# Patient Record
Sex: MALE | Race: WHITE
[De-identification: names, ages, dates, MRNs, and addresses within clinical notes are randomized per-mention and may not be internally consistent; named-entity substitution may affect disease eponyms.]

---

## 2017-05-03 ENCOUNTER — HOSPITAL ENCOUNTER (OUTPATIENT)
Dept: HOSPITAL 83 - LAB | Age: 57
Discharge: HOME | End: 2017-05-03
Attending: CHIROPRACTOR
Payer: COMMERCIAL

## 2017-05-03 DIAGNOSIS — I10: ICD-10-CM

## 2017-05-03 DIAGNOSIS — Z98.890: ICD-10-CM

## 2017-05-03 DIAGNOSIS — M25.512: Primary | ICD-10-CM

## 2017-05-03 LAB
INR BLD: 1 (ref 2–3.5)
PROTHROMBIN TIME: 10.7 SECONDS (ref 9–12.4)

## 2017-07-25 ENCOUNTER — HOSPITAL ENCOUNTER (OUTPATIENT)
Dept: HOSPITAL 83 - RESCLI | Age: 57
Discharge: HOME | End: 2017-07-25
Attending: INTERNAL MEDICINE
Payer: COMMERCIAL

## 2017-07-25 DIAGNOSIS — F41.9: ICD-10-CM

## 2017-07-25 DIAGNOSIS — E03.9: ICD-10-CM

## 2017-07-25 DIAGNOSIS — K21.9: ICD-10-CM

## 2017-07-25 DIAGNOSIS — I10: Primary | ICD-10-CM

## 2017-07-25 DIAGNOSIS — E55.9: ICD-10-CM

## 2017-11-28 ENCOUNTER — HOSPITAL ENCOUNTER (OUTPATIENT)
Dept: HOSPITAL 83 - RESCLI | Age: 57
Discharge: HOME | End: 2017-11-28
Attending: EMERGENCY MEDICINE
Payer: COMMERCIAL

## 2017-11-28 DIAGNOSIS — F32.9: ICD-10-CM

## 2017-11-28 DIAGNOSIS — E03.9: ICD-10-CM

## 2017-11-28 DIAGNOSIS — I10: ICD-10-CM

## 2017-11-28 DIAGNOSIS — K21.9: ICD-10-CM

## 2017-11-28 DIAGNOSIS — F41.1: ICD-10-CM

## 2017-11-28 DIAGNOSIS — E55.9: ICD-10-CM

## 2017-11-28 DIAGNOSIS — F10.20: Primary | ICD-10-CM

## 2018-04-11 ENCOUNTER — HOSPITAL ENCOUNTER (INPATIENT)
Dept: HOSPITAL 83 - ED | Age: 58
LOS: 8 days | Discharge: HOME | DRG: 189 | End: 2018-04-19
Attending: INTERNAL MEDICINE | Admitting: INTERNAL MEDICINE
Payer: COMMERCIAL

## 2018-04-11 VITALS — DIASTOLIC BLOOD PRESSURE: 85 MMHG

## 2018-04-11 VITALS — BODY MASS INDEX: 28.52 KG/M2 | HEIGHT: 66 IN | WEIGHT: 177.44 LBS

## 2018-04-11 VITALS — DIASTOLIC BLOOD PRESSURE: 90 MMHG

## 2018-04-11 VITALS — DIASTOLIC BLOOD PRESSURE: 77 MMHG

## 2018-04-11 DIAGNOSIS — R73.9: ICD-10-CM

## 2018-04-11 DIAGNOSIS — F41.1: ICD-10-CM

## 2018-04-11 DIAGNOSIS — F10.239: ICD-10-CM

## 2018-04-11 DIAGNOSIS — E66.3: ICD-10-CM

## 2018-04-11 DIAGNOSIS — D72.819: ICD-10-CM

## 2018-04-11 DIAGNOSIS — D53.9: ICD-10-CM

## 2018-04-11 DIAGNOSIS — E87.6: ICD-10-CM

## 2018-04-11 DIAGNOSIS — K21.9: ICD-10-CM

## 2018-04-11 DIAGNOSIS — R00.0: ICD-10-CM

## 2018-04-11 DIAGNOSIS — D61.818: ICD-10-CM

## 2018-04-11 DIAGNOSIS — F10.29: ICD-10-CM

## 2018-04-11 DIAGNOSIS — Z79.899: ICD-10-CM

## 2018-04-11 DIAGNOSIS — Z71.6: ICD-10-CM

## 2018-04-11 DIAGNOSIS — D69.6: ICD-10-CM

## 2018-04-11 DIAGNOSIS — I10: ICD-10-CM

## 2018-04-11 DIAGNOSIS — R65.10: ICD-10-CM

## 2018-04-11 DIAGNOSIS — Z79.82: ICD-10-CM

## 2018-04-11 DIAGNOSIS — J96.01: Primary | ICD-10-CM

## 2018-04-11 DIAGNOSIS — E44.0: ICD-10-CM

## 2018-04-11 DIAGNOSIS — E87.1: ICD-10-CM

## 2018-04-11 DIAGNOSIS — E03.9: ICD-10-CM

## 2018-04-11 DIAGNOSIS — F17.200: ICD-10-CM

## 2018-04-11 LAB
ALBUMIN SERPL-MCNC: 3.1 GM/DL (ref 3.1–4.5)
ALP SERPL-CCNC: 100 U/L (ref 45–117)
ALT SERPL W P-5'-P-CCNC: 57 U/L (ref 12–78)
AMPHETAMINES UR QL SCN: < 1000
APPEARANCE UR: CLEAR
APTT PPP: 25.2 SECONDS (ref 20.8–31.5)
AST SERPL-CCNC: 153 IU/L (ref 3–35)
BACTERIA #/AREA URNS HPF: (no result) /[HPF]
BARBITURATES UR QL SCN: < 200
BASOPHILS # BLD AUTO: 0.1 10*3/UL (ref 0–0.1)
BASOPHILS NFR BLD AUTO: 1.7 % (ref 0–1)
BENZODIAZ UR QL SCN: < 200
BILIRUB UR QL STRIP: NEGATIVE
BUN SERPL-MCNC: 3 MG/DL (ref 7–24)
BZE UR QL SCN: < 300
CANNABINOIDS UR QL SCN: < 50
CHLORIDE SERPL-SCNC: 100 MMOL/L (ref 98–107)
COLOR UR: YELLOW
CREAT SERPL-MCNC: 0.5 MG/DL (ref 0.7–1.3)
EOSINOPHIL # BLD AUTO: 0 10*3/UL (ref 0–0.4)
EOSINOPHIL # BLD AUTO: 0.3 % (ref 1–4)
EPI CELLS #/AREA URNS HPF: (no result) /[HPF]
ERYTHROCYTE [DISTWIDTH] IN BLOOD BY AUTOMATED COUNT: 12.9 % (ref 0–14.5)
ETHANOL SERPL-MCNC: 376 MG/DL (ref ?–3)
GLUCOSE UR QL: NEGATIVE
HCT VFR BLD AUTO: 39 % (ref 42–52)
HGB BLD-MCNC: 13.6 G/DL (ref 14–18)
HGB UR QL STRIP: NEGATIVE
INR BLD: 1.1 (ref 2–3.5)
KETONES UR QL STRIP: NEGATIVE
LEUKOCYTE ESTERASE UR QL STRIP: NEGATIVE
LYMPHOCYTES # BLD AUTO: 1 10*3/UL (ref 1.3–4.4)
LYMPHOCYTES NFR BLD AUTO: 26.6 % (ref 27–41)
MCH RBC QN AUTO: 35.1 PG (ref 27–31)
MCHC RBC AUTO-ENTMCNC: 34.9 G/DL (ref 33–37)
MCV RBC AUTO: 100.5 FL (ref 80–94)
METHADONE UR QL SCN: < 300
MONOCYTES # BLD AUTO: 0.7 10*3/UL (ref 0.1–1)
MONOCYTES NFR BLD MANUAL: 19.9 % (ref 3–9)
NEUT #: 1.8 10*3/UL (ref 2.3–7.9)
NEUT %: 51.5 % (ref 47–73)
NITRITE UR QL STRIP: NEGATIVE
NRBC BLD QL AUTO: 0 % (ref 0–0)
OPIATES UR QL SCN: < 300
PCP UR QL SCN: <  25
PH UR STRIP: 5.5 [PH] (ref 5–9)
PLATELET # BLD AUTO: 127 10*3/UL (ref 130–400)
PMV BLD AUTO: 9 FL (ref 9.6–12.3)
POTASSIUM SERPL-SCNC: 3.3 MMOL/L (ref 3.5–5.1)
PROT SERPL-MCNC: 7.9 GM/DL (ref 6.4–8.2)
RBC # BLD AUTO: 3.88 10*6/UL (ref 4.5–5.9)
RBC #/AREA URNS HPF: (no result) RBC/HPF (ref 0–2)
SODIUM SERPL-SCNC: 133 MMOL/L (ref 136–145)
SP GR UR: <= 1.005 (ref 1–1.03)
UROBILINOGEN UR STRIP-MCNC: 0.2 E.U./DL (ref 0.2–1)
WBC #/AREA URNS HPF: (no result) WBC/HPF (ref 0–5)
WBC NRBC COR # BLD AUTO: 3.6 10*3/UL (ref 4.8–10.8)

## 2018-04-12 VITALS — DIASTOLIC BLOOD PRESSURE: 79 MMHG

## 2018-04-12 VITALS — DIASTOLIC BLOOD PRESSURE: 105 MMHG

## 2018-04-12 VITALS — DIASTOLIC BLOOD PRESSURE: 54 MMHG | SYSTOLIC BLOOD PRESSURE: 124 MMHG

## 2018-04-12 VITALS — SYSTOLIC BLOOD PRESSURE: 141 MMHG | DIASTOLIC BLOOD PRESSURE: 70 MMHG

## 2018-04-12 VITALS — DIASTOLIC BLOOD PRESSURE: 98 MMHG

## 2018-04-12 LAB
ALBUMIN SERPL-MCNC: 2.9 GM/DL (ref 3.1–4.5)
ALP SERPL-CCNC: 89 U/L (ref 45–117)
ALT SERPL W P-5'-P-CCNC: 53 U/L (ref 12–78)
AST SERPL-CCNC: 131 IU/L (ref 3–35)
BASOPHILS # BLD AUTO: 0.1 10*3/UL (ref 0–0.1)
BASOPHILS NFR BLD AUTO: 1.9 % (ref 0–1)
BUN SERPL-MCNC: 3 MG/DL (ref 7–24)
CHLORIDE SERPL-SCNC: 103 MMOL/L (ref 98–107)
CREAT SERPL-MCNC: 0.46 MG/DL (ref 0.7–1.3)
EOSINOPHIL # BLD AUTO: 0 10*3/UL (ref 0–0.4)
EOSINOPHIL # BLD AUTO: 0.9 % (ref 1–4)
ERYTHROCYTE [DISTWIDTH] IN BLOOD BY AUTOMATED COUNT: 13 % (ref 0–14.5)
HCT VFR BLD AUTO: 37 % (ref 42–52)
HGB BLD-MCNC: 12.9 G/DL (ref 14–18)
LYMPHOCYTES # BLD AUTO: 1 10*3/UL (ref 1.3–4.4)
LYMPHOCYTES NFR BLD AUTO: 31.8 % (ref 27–41)
MCH RBC QN AUTO: 35.6 PG (ref 27–31)
MCHC RBC AUTO-ENTMCNC: 34.9 G/DL (ref 33–37)
MCV RBC AUTO: 102.2 FL (ref 80–94)
MONOCYTES # BLD AUTO: 0.6 10*3/UL (ref 0.1–1)
MONOCYTES NFR BLD MANUAL: 17.1 % (ref 3–9)
NEUT #: 1.5 10*3/UL (ref 2.3–7.9)
NEUT %: 48 % (ref 47–73)
NRBC BLD QL AUTO: 0 10*3/UL (ref 0–0)
PLATELET # BLD AUTO: 124 10*3/UL (ref 130–400)
PMV BLD AUTO: 9.5 FL (ref 9.6–12.3)
POTASSIUM SERPL-SCNC: 3.5 MMOL/L (ref 3.5–5.1)
PROT SERPL-MCNC: 7.3 GM/DL (ref 6.4–8.2)
RBC # BLD AUTO: 3.62 10*6/UL (ref 4.5–5.9)
SODIUM SERPL-SCNC: 136 MMOL/L (ref 136–145)
WBC NRBC COR # BLD AUTO: 3.2 10*3/UL (ref 4.8–10.8)

## 2018-04-13 VITALS — SYSTOLIC BLOOD PRESSURE: 130 MMHG | DIASTOLIC BLOOD PRESSURE: 77 MMHG

## 2018-04-13 VITALS — DIASTOLIC BLOOD PRESSURE: 96 MMHG | SYSTOLIC BLOOD PRESSURE: 152 MMHG

## 2018-04-13 VITALS — DIASTOLIC BLOOD PRESSURE: 93 MMHG | SYSTOLIC BLOOD PRESSURE: 163 MMHG

## 2018-04-13 VITALS — DIASTOLIC BLOOD PRESSURE: 86 MMHG

## 2018-04-13 VITALS — DIASTOLIC BLOOD PRESSURE: 88 MMHG | SYSTOLIC BLOOD PRESSURE: 134 MMHG

## 2018-04-13 VITALS — DIASTOLIC BLOOD PRESSURE: 98 MMHG

## 2018-04-14 VITALS — DIASTOLIC BLOOD PRESSURE: 113 MMHG

## 2018-04-14 VITALS — DIASTOLIC BLOOD PRESSURE: 106 MMHG

## 2018-04-14 VITALS — SYSTOLIC BLOOD PRESSURE: 146 MMHG | DIASTOLIC BLOOD PRESSURE: 97 MMHG

## 2018-04-14 VITALS — DIASTOLIC BLOOD PRESSURE: 93 MMHG | SYSTOLIC BLOOD PRESSURE: 128 MMHG

## 2018-04-14 VITALS — DIASTOLIC BLOOD PRESSURE: 89 MMHG | SYSTOLIC BLOOD PRESSURE: 196 MMHG

## 2018-04-14 VITALS — DIASTOLIC BLOOD PRESSURE: 79 MMHG

## 2018-04-14 LAB
ALBUMIN SERPL-MCNC: 3.2 GM/DL (ref 3.1–4.5)
ALP SERPL-CCNC: 91 U/L (ref 45–117)
ALT SERPL W P-5'-P-CCNC: 46 U/L (ref 12–78)
AST SERPL-CCNC: 85 IU/L (ref 3–35)
BASOPHILS # BLD AUTO: 0 10*3/UL (ref 0–0.1)
BASOPHILS NFR BLD AUTO: 0.5 % (ref 0–1)
BUN SERPL-MCNC: 4 MG/DL (ref 7–24)
CHLORIDE SERPL-SCNC: 105 MMOL/L (ref 98–107)
CREAT SERPL-MCNC: 0.53 MG/DL (ref 0.7–1.3)
EOSINOPHIL # BLD AUTO: 0 10*3/UL (ref 0–0.4)
EOSINOPHIL # BLD AUTO: 0.4 % (ref 1–4)
ERYTHROCYTE [DISTWIDTH] IN BLOOD BY AUTOMATED COUNT: 12.6 % (ref 0–14.5)
HCT VFR BLD AUTO: 38.2 % (ref 42–52)
HGB BLD-MCNC: 13.3 G/DL (ref 14–18)
LYMPHOCYTES # BLD AUTO: 0.8 10*3/UL (ref 1.3–4.4)
LYMPHOCYTES NFR BLD AUTO: 15.1 % (ref 27–41)
MCH RBC QN AUTO: 35.2 PG (ref 27–31)
MCHC RBC AUTO-ENTMCNC: 34.8 G/DL (ref 33–37)
MCV RBC AUTO: 101.1 FL (ref 80–94)
MONOCYTES # BLD AUTO: 0.7 10*3/UL (ref 0.1–1)
MONOCYTES NFR BLD MANUAL: 12.2 % (ref 3–9)
NEUT #: 3.9 10*3/UL (ref 2.3–7.9)
NEUT %: 71.4 % (ref 47–73)
NRBC BLD QL AUTO: 0 10*3/UL (ref 0–0)
PHOSPHATE SERPL-MCNC: 2.4 MG/DL (ref 2.5–4.9)
PLATELET # BLD AUTO: 130 10*3/UL (ref 130–400)
PMV BLD AUTO: 10.2 FL (ref 9.6–12.3)
POTASSIUM SERPL-SCNC: 3.2 MMOL/L (ref 3.5–5.1)
PROT SERPL-MCNC: 7.9 GM/DL (ref 6.4–8.2)
RBC # BLD AUTO: 3.78 10*6/UL (ref 4.5–5.9)
SODIUM SERPL-SCNC: 139 MMOL/L (ref 136–145)
WBC NRBC COR # BLD AUTO: 5.5 10*3/UL (ref 4.8–10.8)

## 2018-04-15 VITALS — DIASTOLIC BLOOD PRESSURE: 77 MMHG | SYSTOLIC BLOOD PRESSURE: 129 MMHG

## 2018-04-15 VITALS — DIASTOLIC BLOOD PRESSURE: 84 MMHG | SYSTOLIC BLOOD PRESSURE: 127 MMHG

## 2018-04-15 VITALS — DIASTOLIC BLOOD PRESSURE: 87 MMHG | SYSTOLIC BLOOD PRESSURE: 139 MMHG

## 2018-04-15 VITALS — DIASTOLIC BLOOD PRESSURE: 74 MMHG

## 2018-04-15 VITALS — DIASTOLIC BLOOD PRESSURE: 93 MMHG | SYSTOLIC BLOOD PRESSURE: 128 MMHG

## 2018-04-15 VITALS — DIASTOLIC BLOOD PRESSURE: 82 MMHG

## 2018-04-15 LAB
ALBUMIN SERPL-MCNC: 2.8 GM/DL (ref 3.1–4.5)
ALP SERPL-CCNC: 69 U/L (ref 45–117)
ALT SERPL W P-5'-P-CCNC: 47 U/L (ref 12–78)
AST SERPL-CCNC: 88 IU/L (ref 3–35)
BUN SERPL-MCNC: 7 MG/DL (ref 7–24)
CHLORIDE SERPL-SCNC: 101 MMOL/L (ref 98–107)
CREAT SERPL-MCNC: 0.49 MG/DL (ref 0.7–1.3)
POTASSIUM SERPL-SCNC: 3 MMOL/L (ref 3.5–5.1)
PROT SERPL-MCNC: 7 GM/DL (ref 6.4–8.2)
SODIUM SERPL-SCNC: 137 MMOL/L (ref 136–145)

## 2018-04-16 VITALS — DIASTOLIC BLOOD PRESSURE: 81 MMHG

## 2018-04-16 VITALS — DIASTOLIC BLOOD PRESSURE: 87 MMHG

## 2018-04-16 VITALS — SYSTOLIC BLOOD PRESSURE: 142 MMHG | DIASTOLIC BLOOD PRESSURE: 82 MMHG

## 2018-04-16 VITALS — DIASTOLIC BLOOD PRESSURE: 76 MMHG | SYSTOLIC BLOOD PRESSURE: 144 MMHG

## 2018-04-16 VITALS — DIASTOLIC BLOOD PRESSURE: 73 MMHG

## 2018-04-17 VITALS — DIASTOLIC BLOOD PRESSURE: 86 MMHG

## 2018-04-17 VITALS — DIASTOLIC BLOOD PRESSURE: 80 MMHG

## 2018-04-17 VITALS — DIASTOLIC BLOOD PRESSURE: 74 MMHG | SYSTOLIC BLOOD PRESSURE: 130 MMHG

## 2018-04-17 VITALS — SYSTOLIC BLOOD PRESSURE: 134 MMHG | DIASTOLIC BLOOD PRESSURE: 84 MMHG

## 2018-04-17 VITALS — SYSTOLIC BLOOD PRESSURE: 138 MMHG | DIASTOLIC BLOOD PRESSURE: 75 MMHG

## 2018-04-17 LAB
BUN SERPL-MCNC: 6 MG/DL (ref 7–24)
CHLORIDE SERPL-SCNC: 102 MMOL/L (ref 98–107)
CREAT SERPL-MCNC: 0.5 MG/DL (ref 0.7–1.3)
PHOSPHATE SERPL-MCNC: 3 MG/DL (ref 2.5–4.9)
POTASSIUM SERPL-SCNC: 3.1 MMOL/L (ref 3.5–5.1)
SODIUM SERPL-SCNC: 136 MMOL/L (ref 136–145)

## 2018-04-18 VITALS — SYSTOLIC BLOOD PRESSURE: 143 MMHG | DIASTOLIC BLOOD PRESSURE: 76 MMHG

## 2018-04-18 VITALS — SYSTOLIC BLOOD PRESSURE: 118 MMHG | DIASTOLIC BLOOD PRESSURE: 78 MMHG

## 2018-04-18 VITALS — DIASTOLIC BLOOD PRESSURE: 80 MMHG

## 2018-04-18 VITALS — DIASTOLIC BLOOD PRESSURE: 81 MMHG | SYSTOLIC BLOOD PRESSURE: 139 MMHG

## 2018-04-18 VITALS — DIASTOLIC BLOOD PRESSURE: 78 MMHG

## 2018-04-18 LAB
BASOPHILS # BLD AUTO: 0.1 10*3/UL (ref 0–0.1)
BASOPHILS NFR BLD AUTO: 1 % (ref 0–1)
BUN SERPL-MCNC: 6 MG/DL (ref 7–24)
CHLORIDE SERPL-SCNC: 101 MMOL/L (ref 98–107)
CREAT SERPL-MCNC: 0.45 MG/DL (ref 0.7–1.3)
EOSINOPHIL # BLD AUTO: 0.2 10*3/UL (ref 0–0.4)
EOSINOPHIL # BLD AUTO: 3.7 % (ref 1–4)
ERYTHROCYTE [DISTWIDTH] IN BLOOD BY AUTOMATED COUNT: 12 % (ref 0–14.5)
HCT VFR BLD AUTO: 39.2 % (ref 42–52)
HGB BLD-MCNC: 13.4 G/DL (ref 14–18)
LYMPHOCYTES # BLD AUTO: 1.3 10*3/UL (ref 1.3–4.4)
LYMPHOCYTES NFR BLD AUTO: 27.2 % (ref 27–41)
MCH RBC QN AUTO: 35.1 PG (ref 27–31)
MCHC RBC AUTO-ENTMCNC: 34.2 G/DL (ref 33–37)
MCV RBC AUTO: 102.6 FL (ref 80–94)
MONOCYTES # BLD AUTO: 0.9 10*3/UL (ref 0.1–1)
MONOCYTES NFR BLD MANUAL: 18.5 % (ref 3–9)
NEUT #: 2.4 10*3/UL (ref 2.3–7.9)
NEUT %: 48.8 % (ref 47–73)
NRBC BLD QL AUTO: 0 10*3/UL (ref 0–0)
PLATELET # BLD AUTO: 173 10*3/UL (ref 130–400)
PMV BLD AUTO: 10.3 FL (ref 9.6–12.3)
POTASSIUM SERPL-SCNC: 3.2 MMOL/L (ref 3.5–5.1)
RBC # BLD AUTO: 3.82 10*6/UL (ref 4.5–5.9)
SODIUM SERPL-SCNC: 136 MMOL/L (ref 136–145)
WBC NRBC COR # BLD AUTO: 4.9 10*3/UL (ref 4.8–10.8)

## 2018-04-19 VITALS — DIASTOLIC BLOOD PRESSURE: 95 MMHG

## 2018-04-19 VITALS — DIASTOLIC BLOOD PRESSURE: 68 MMHG

## 2018-04-19 LAB
BUN SERPL-MCNC: 5 MG/DL (ref 7–24)
CHLORIDE SERPL-SCNC: 100 MMOL/L (ref 98–107)
CREAT SERPL-MCNC: 0.52 MG/DL (ref 0.7–1.3)
POTASSIUM SERPL-SCNC: 3.6 MMOL/L (ref 3.5–5.1)
SODIUM SERPL-SCNC: 134 MMOL/L (ref 136–145)

## 2018-05-02 ENCOUNTER — HOSPITAL ENCOUNTER (INPATIENT)
Dept: HOSPITAL 83 - ED | Age: 58
LOS: 11 days | Discharge: HOME | DRG: 871 | End: 2018-05-13
Attending: INTERNAL MEDICINE | Admitting: INTERNAL MEDICINE
Payer: COMMERCIAL

## 2018-05-02 VITALS — SYSTOLIC BLOOD PRESSURE: 146 MMHG | DIASTOLIC BLOOD PRESSURE: 95 MMHG

## 2018-05-02 VITALS — DIASTOLIC BLOOD PRESSURE: 96 MMHG

## 2018-05-02 VITALS — SYSTOLIC BLOOD PRESSURE: 157 MMHG | DIASTOLIC BLOOD PRESSURE: 107 MMHG

## 2018-05-02 VITALS — SYSTOLIC BLOOD PRESSURE: 147 MMHG | DIASTOLIC BLOOD PRESSURE: 98 MMHG

## 2018-05-02 VITALS — DIASTOLIC BLOOD PRESSURE: 88 MMHG

## 2018-05-02 VITALS — DIASTOLIC BLOOD PRESSURE: 80 MMHG

## 2018-05-02 VITALS — WEIGHT: 168.25 LBS | BODY MASS INDEX: 27.04 KG/M2 | HEIGHT: 66 IN

## 2018-05-02 VITALS — DIASTOLIC BLOOD PRESSURE: 89 MMHG
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
## 2018-05-02 DIAGNOSIS — E87.6: ICD-10-CM

## 2018-05-02 DIAGNOSIS — E66.9: ICD-10-CM

## 2018-05-02 DIAGNOSIS — R74.0: ICD-10-CM
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
## 2018-05-02 DIAGNOSIS — F17.200: ICD-10-CM

## 2018-05-02 DIAGNOSIS — E03.9: ICD-10-CM

## 2018-05-02 DIAGNOSIS — E46: ICD-10-CM

## 2018-05-02 DIAGNOSIS — E83.41: ICD-10-CM

## 2018-05-02 DIAGNOSIS — A41.9: Primary | ICD-10-CM

## 2018-05-02 DIAGNOSIS — E78.2: ICD-10-CM

## 2018-05-02 DIAGNOSIS — J96.00: ICD-10-CM

## 2018-05-02 DIAGNOSIS — T17.590A: ICD-10-CM

## 2018-05-02 DIAGNOSIS — R73.9: ICD-10-CM

## 2018-05-02 DIAGNOSIS — J21.9: ICD-10-CM
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
## 2018-05-02 DIAGNOSIS — E87.1: ICD-10-CM

## 2018-05-02 DIAGNOSIS — I10: ICD-10-CM

## 2018-05-02 DIAGNOSIS — K21.9: ICD-10-CM

## 2018-05-02 DIAGNOSIS — E87.5: ICD-10-CM

## 2018-05-02 DIAGNOSIS — D53.9: ICD-10-CM

## 2018-05-02 DIAGNOSIS — E55.9: ICD-10-CM

## 2018-05-02 DIAGNOSIS — F10.129: ICD-10-CM
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
## 2018-05-02 DIAGNOSIS — T17.490A: ICD-10-CM

## 2018-05-02 DIAGNOSIS — Z71.6: ICD-10-CM

## 2018-05-02 DIAGNOSIS — F32.9: ICD-10-CM

## 2018-05-02 DIAGNOSIS — E87.2: ICD-10-CM

## 2018-05-02 DIAGNOSIS — F41.1: ICD-10-CM
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
## 2018-05-02 DIAGNOSIS — F41.9: ICD-10-CM

## 2018-05-02 DIAGNOSIS — S22.42XA: ICD-10-CM

## 2018-05-02 DIAGNOSIS — K12.2: ICD-10-CM

## 2018-05-02 DIAGNOSIS — Z82.49: ICD-10-CM

## 2018-05-02 DIAGNOSIS — Z79.82: ICD-10-CM

## 2018-05-02 DIAGNOSIS — F10.231: ICD-10-CM

## 2018-05-02 LAB
ALBUMIN SERPL-MCNC: 3.4 GM/DL (ref 3.1–4.5)
ALP SERPL-CCNC: 94 U/L (ref 45–117)
ALT SERPL W P-5'-P-CCNC: 76 U/L (ref 12–78)
APTT PPP: 26.3 SECONDS (ref 20.8–31.5)
AST SERPL-CCNC: 113 IU/L (ref 3–35)
BASOPHILS # BLD AUTO: 0.1 10*3/UL (ref 0–0.1)
BASOPHILS NFR BLD AUTO: 1.6 % (ref 0–1)
BUN SERPL-MCNC: 3 MG/DL (ref 7–24)
CHLORIDE SERPL-SCNC: 100 MMOL/L (ref 98–107)
CREAT SERPL-MCNC: 0.57 MG/DL (ref 0.7–1.3)
EOSINOPHIL # BLD AUTO: 0 10*3/UL (ref 0–0.4)
EOSINOPHIL # BLD AUTO: 0.5 % (ref 1–4)
ERYTHROCYTE [DISTWIDTH] IN BLOOD BY AUTOMATED COUNT: 11.7 % (ref 0–14.5)
ETHANOL SERPL-MCNC: 418 MG/DL (ref ?–3)
HCT VFR BLD AUTO: 41.3 % (ref 42–52)
HGB BLD-MCNC: 14.7 G/DL (ref 14–18)
INR BLD: 1.1 (ref 2–3.5)
LYMPHOCYTES # BLD AUTO: 1.7 10*3/UL (ref 1.3–4.4)
LYMPHOCYTES NFR BLD AUTO: 28.7 % (ref 27–41)
MCH RBC QN AUTO: 35 PG (ref 27–31)
MCHC RBC AUTO-ENTMCNC: 35.6 G/DL (ref 33–37)
MCV RBC AUTO: 98.3 FL (ref 80–94)
MONOCYTES # BLD AUTO: 0.6 10*3/UL (ref 0.1–1)
MONOCYTES NFR BLD MANUAL: 11 % (ref 3–9)
NEUT #: 3.3 10*3/UL (ref 2.3–7.9)
NEUT %: 58 % (ref 47–73)
NRBC BLD QL AUTO: 0 % (ref 0–0)
PLATELET # BLD AUTO: 301 10*3/UL (ref 130–400)
PMV BLD AUTO: 8.7 FL (ref 9.6–12.3)
POTASSIUM SERPL-SCNC: 3.5 MMOL/L (ref 3.5–5.1)
PROT SERPL-MCNC: 8.5 GM/DL (ref 6.4–8.2)
RBC # BLD AUTO: 4.2 10*6/UL (ref 4.5–5.9)
SODIUM SERPL-SCNC: 133 MMOL/L (ref 136–145)
TROPONIN I SERPL-MCNC: < 0.015 NG/ML (ref ?–0.04)
WBC NRBC COR # BLD AUTO: 5.7 10*3/UL (ref 4.8–10.8)

## 2018-05-03 VITALS — DIASTOLIC BLOOD PRESSURE: 100 MMHG

## 2018-05-03 VITALS — DIASTOLIC BLOOD PRESSURE: 105 MMHG

## 2018-05-03 VITALS — DIASTOLIC BLOOD PRESSURE: 75 MMHG

## 2018-05-03 VITALS — DIASTOLIC BLOOD PRESSURE: 97 MMHG | SYSTOLIC BLOOD PRESSURE: 150 MMHG

## 2018-05-03 VITALS — DIASTOLIC BLOOD PRESSURE: 48 MMHG | SYSTOLIC BLOOD PRESSURE: 155 MMHG

## 2018-05-03 VITALS — SYSTOLIC BLOOD PRESSURE: 141 MMHG | DIASTOLIC BLOOD PRESSURE: 90 MMHG

## 2018-05-03 LAB
25(OH)D3 SERPL-MCNC: 23.7 NG/ML (ref 30–100)
ALBUMIN SERPL-MCNC: 2.9 GM/DL (ref 3.1–4.5)
ALP SERPL-CCNC: 77 U/L (ref 45–117)
ALT SERPL W P-5'-P-CCNC: 64 U/L (ref 12–78)
AMPHETAMINES UR QL SCN: < 1000
APPEARANCE UR: CLEAR
AST SERPL-CCNC: 99 IU/L (ref 3–35)
BARBITURATES UR QL SCN: < 200
BASOPHILS # BLD AUTO: 0.1 10*3/UL (ref 0–0.1)
BASOPHILS NFR BLD AUTO: 1.6 % (ref 0–1)
BENZODIAZ UR QL SCN: > 200
BILIRUB UR QL STRIP: NEGATIVE
BUN SERPL-MCNC: 3 MG/DL (ref 7–24)
BZE UR QL SCN: < 300
CANNABINOIDS UR QL SCN: < 50
CASTS URNS QL MICRO: (no result)
CHLORIDE SERPL-SCNC: 102 MMOL/L (ref 98–107)
CHOLEST SERPL-MCNC: 117 MG/DL (ref ?–200)
COLOR UR: YELLOW
CREAT SERPL-MCNC: 0.48 MG/DL (ref 0.7–1.3)
EOSINOPHIL # BLD AUTO: 0 10*3/UL (ref 0–0.4)
EOSINOPHIL # BLD AUTO: 0.6 % (ref 1–4)
ERYTHROCYTE [DISTWIDTH] IN BLOOD BY AUTOMATED COUNT: 11.7 % (ref 0–14.5)
GLUCOSE UR QL: NEGATIVE
HCT VFR BLD AUTO: 37.9 % (ref 42–52)
HDLC SERPL-MCNC: 50 MG/DL (ref 40–60)
HGB BLD-MCNC: 13.3 G/DL (ref 14–18)
HGB UR QL STRIP: NEGATIVE
KETONES UR QL STRIP: (no result)
LDLC SERPL DIRECT ASSAY-MCNC: 37 MG/DL (ref 9–159)
LEUKOCYTE ESTERASE UR QL STRIP: NEGATIVE
LYMPHOCYTES # BLD AUTO: 1.9 10*3/UL (ref 1.3–4.4)
LYMPHOCYTES NFR BLD AUTO: 37.5 % (ref 27–41)
MCH RBC QN AUTO: 35.2 PG (ref 27–31)
MCHC RBC AUTO-ENTMCNC: 35.1 G/DL (ref 33–37)
MCV RBC AUTO: 100.3 FL (ref 80–94)
METHADONE UR QL SCN: < 300
MONOCYTES # BLD AUTO: 0.4 10*3/UL (ref 0.1–1)
MONOCYTES NFR BLD MANUAL: 8 % (ref 3–9)
MUCOUS THREADS URNS QL MICRO: (no result)
NEUT #: 2.7 10*3/UL (ref 2.3–7.9)
NEUT %: 51.9 % (ref 47–73)
NITRITE UR QL STRIP: NEGATIVE
NRBC BLD QL AUTO: 0 10*3/UL (ref 0–0)
OPIATES UR QL SCN: > 300
PCP UR QL SCN: <  25
PH UR STRIP: 6 [PH] (ref 5–9)
PHOSPHATE SERPL-MCNC: 3.6 MG/DL (ref 2.5–4.9)
PLATELET # BLD AUTO: 245 10*3/UL (ref 130–400)
PMV BLD AUTO: 9.5 FL (ref 9.6–12.3)
POTASSIUM SERPL-SCNC: 3.2 MMOL/L (ref 3.5–5.1)
PROT SERPL-MCNC: 7.1 GM/DL (ref 6.4–8.2)
RBC # BLD AUTO: 3.78 10*6/UL (ref 4.5–5.9)
RBC #/AREA URNS HPF: (no result) RBC/HPF (ref 0–2)
SODIUM SERPL-SCNC: 135 MMOL/L (ref 136–145)
SP GR UR: 1.02 (ref 1–1.03)
TRIGL SERPL-MCNC: 148 MG/DL (ref ?–150)
TSH SERPL DL<=0.005 MIU/L-ACNC: 0.89 UIU/ML (ref 0.36–4.75)
UROBILINOGEN UR STRIP-MCNC: 1 E.U./DL (ref 0.2–1)
VITAMIN B12: 679 PG/ML (ref 247–911)
VLDLC SERPL CALC-MCNC: 30 MG/DL (ref 6–40)
WBC #/AREA URNS HPF: (no result) WBC/HPF (ref 0–5)
WBC NRBC COR # BLD AUTO: 5.1 10*3/UL (ref 4.8–10.8)

## 2018-05-04 VITALS — DIASTOLIC BLOOD PRESSURE: 87 MMHG

## 2018-05-04 VITALS — DIASTOLIC BLOOD PRESSURE: 103 MMHG

## 2018-05-04 VITALS — DIASTOLIC BLOOD PRESSURE: 84 MMHG

## 2018-05-04 VITALS — DIASTOLIC BLOOD PRESSURE: 93 MMHG

## 2018-05-04 VITALS — DIASTOLIC BLOOD PRESSURE: 101 MMHG

## 2018-05-04 VITALS — SYSTOLIC BLOOD PRESSURE: 148 MMHG | DIASTOLIC BLOOD PRESSURE: 88 MMHG

## 2018-05-04 LAB
BASOPHILS # BLD AUTO: 0.1 10*3/UL (ref 0–0.1)
BASOPHILS NFR BLD AUTO: 1.2 % (ref 0–1)
BUN SERPL-MCNC: 4 MG/DL (ref 7–24)
CHLORIDE SERPL-SCNC: 102 MMOL/L (ref 98–107)
CREAT SERPL-MCNC: 0.53 MG/DL (ref 0.7–1.3)
EOSINOPHIL # BLD AUTO: 0.1 10*3/UL (ref 0–0.4)
EOSINOPHIL # BLD AUTO: 1.9 % (ref 1–4)
ERYTHROCYTE [DISTWIDTH] IN BLOOD BY AUTOMATED COUNT: 11.6 % (ref 0–14.5)
HCT VFR BLD AUTO: 39 % (ref 42–52)
HGB BLD-MCNC: 13.4 G/DL (ref 14–18)
LYMPHOCYTES # BLD AUTO: 1.1 10*3/UL (ref 1.3–4.4)
LYMPHOCYTES NFR BLD AUTO: 26.2 % (ref 27–41)
MCH RBC QN AUTO: 34.9 PG (ref 27–31)
MCHC RBC AUTO-ENTMCNC: 34.4 G/DL (ref 33–37)
MCV RBC AUTO: 101.6 FL (ref 80–94)
MONOCYTES # BLD AUTO: 0.6 10*3/UL (ref 0.1–1)
MONOCYTES NFR BLD MANUAL: 14.3 % (ref 3–9)
NEUT #: 2.3 10*3/UL (ref 2.3–7.9)
NEUT %: 55.9 % (ref 47–73)
NRBC BLD QL AUTO: 0 10*3/UL (ref 0–0)
PLATELET # BLD AUTO: 178 10*3/UL (ref 130–400)
PMV BLD AUTO: 9.5 FL (ref 9.6–12.3)
POTASSIUM SERPL-SCNC: 3.3 MMOL/L (ref 3.5–5.1)
RBC # BLD AUTO: 3.84 10*6/UL (ref 4.5–5.9)
SODIUM SERPL-SCNC: 135 MMOL/L (ref 136–145)
WBC NRBC COR # BLD AUTO: 4.1 10*3/UL (ref 4.8–10.8)

## 2018-05-05 VITALS — SYSTOLIC BLOOD PRESSURE: 98 MMHG | DIASTOLIC BLOOD PRESSURE: 71 MMHG

## 2018-05-05 VITALS — SYSTOLIC BLOOD PRESSURE: 106 MMHG | DIASTOLIC BLOOD PRESSURE: 75 MMHG

## 2018-05-05 VITALS — DIASTOLIC BLOOD PRESSURE: 46 MMHG

## 2018-05-05 VITALS — DIASTOLIC BLOOD PRESSURE: 94 MMHG

## 2018-05-05 VITALS — DIASTOLIC BLOOD PRESSURE: 98 MMHG

## 2018-05-05 VITALS — SYSTOLIC BLOOD PRESSURE: 106 MMHG | DIASTOLIC BLOOD PRESSURE: 54 MMHG

## 2018-05-05 VITALS — DIASTOLIC BLOOD PRESSURE: 76 MMHG

## 2018-05-05 VITALS — SYSTOLIC BLOOD PRESSURE: 140 MMHG | DIASTOLIC BLOOD PRESSURE: 97 MMHG

## 2018-05-05 VITALS — DIASTOLIC BLOOD PRESSURE: 97 MMHG

## 2018-05-05 VITALS — SYSTOLIC BLOOD PRESSURE: 129 MMHG | DIASTOLIC BLOOD PRESSURE: 92 MMHG

## 2018-05-05 VITALS — DIASTOLIC BLOOD PRESSURE: 87 MMHG | SYSTOLIC BLOOD PRESSURE: 126 MMHG

## 2018-05-05 LAB
BASE EXCESS BLDA CALC-SCNC: -1.9 MMOL/L (ref -2–2)
BUN SERPL-MCNC: 4 MG/DL (ref 7–24)
CHLORIDE SERPL-SCNC: 97 MMOL/L (ref 98–107)
CREAT SERPL-MCNC: 0.57 MG/DL (ref 0.7–1.3)
HCO3 BLDA-SCNC: 22.3 MMOL/L (ref 22–26)
PCO2 BLDA: 36.9 MMHG (ref 35–45)
PH BLDA: 7.39 [PH] (ref 7.35–7.45)
PO2 BLDA: 210 MMHG (ref 80–90)
POTASSIUM SERPL-SCNC: 3 MMOL/L (ref 3.5–5.1)
SAO2 % BLDA: 99.6 % (ref 95–97)
SODIUM SERPL-SCNC: 132 MMOL/L (ref 136–145)

## 2018-05-06 VITALS — DIASTOLIC BLOOD PRESSURE: 81 MMHG

## 2018-05-06 VITALS — SYSTOLIC BLOOD PRESSURE: 101 MMHG | DIASTOLIC BLOOD PRESSURE: 68 MMHG

## 2018-05-06 VITALS — SYSTOLIC BLOOD PRESSURE: 90 MMHG | DIASTOLIC BLOOD PRESSURE: 66 MMHG

## 2018-05-06 VITALS — DIASTOLIC BLOOD PRESSURE: 69 MMHG

## 2018-05-06 VITALS — DIASTOLIC BLOOD PRESSURE: 72 MMHG | SYSTOLIC BLOOD PRESSURE: 99 MMHG

## 2018-05-06 VITALS — DIASTOLIC BLOOD PRESSURE: 60 MMHG

## 2018-05-06 VITALS — SYSTOLIC BLOOD PRESSURE: 95 MMHG | DIASTOLIC BLOOD PRESSURE: 61 MMHG

## 2018-05-06 VITALS — DIASTOLIC BLOOD PRESSURE: 74 MMHG

## 2018-05-06 VITALS — DIASTOLIC BLOOD PRESSURE: 68 MMHG

## 2018-05-06 VITALS — DIASTOLIC BLOOD PRESSURE: 72 MMHG

## 2018-05-06 VITALS — DIASTOLIC BLOOD PRESSURE: 90 MMHG | SYSTOLIC BLOOD PRESSURE: 124 MMHG

## 2018-05-06 VITALS — DIASTOLIC BLOOD PRESSURE: 90 MMHG

## 2018-05-06 LAB
ALBUMIN SERPL-MCNC: 3 GM/DL (ref 3.1–4.5)
ALP SERPL-CCNC: 78 U/L (ref 45–117)
ALT SERPL W P-5'-P-CCNC: 66 U/L (ref 12–78)
AST SERPL-CCNC: 86 IU/L (ref 3–35)
BASE EXCESS BLDA CALC-SCNC: 0.5 MMOL/L (ref -2–2)
BASOPHILS # BLD AUTO: 0 10*3/UL (ref 0–0.1)
BASOPHILS NFR BLD AUTO: 0.4 % (ref 0–1)
BUN SERPL-MCNC: 6 MG/DL (ref 7–24)
CHLORIDE SERPL-SCNC: 101 MMOL/L (ref 98–107)
CREAT SERPL-MCNC: 0.53 MG/DL (ref 0.7–1.3)
EOSINOPHIL # BLD AUTO: 0.3 10*3/UL (ref 0–0.4)
EOSINOPHIL # BLD AUTO: 3.2 % (ref 1–4)
ERYTHROCYTE [DISTWIDTH] IN BLOOD BY AUTOMATED COUNT: 11.7 % (ref 0–14.5)
HCO3 BLDA-SCNC: 23.7 MMOL/L (ref 22–26)
HCT VFR BLD AUTO: 39.5 % (ref 42–52)
HGB BLD-MCNC: 13.1 G/DL (ref 14–18)
LYMPHOCYTES # BLD AUTO: 1.1 10*3/UL (ref 1.3–4.4)
LYMPHOCYTES NFR BLD AUTO: 13.3 % (ref 27–41)
MCH RBC QN AUTO: 34.3 PG (ref 27–31)
MCHC RBC AUTO-ENTMCNC: 33.2 G/DL (ref 33–37)
MCV RBC AUTO: 103.4 FL (ref 80–94)
MONOCYTES # BLD AUTO: 0.7 10*3/UL (ref 0.1–1)
MONOCYTES NFR BLD MANUAL: 7.9 % (ref 3–9)
NEUT #: 6.1 10*3/UL (ref 2.3–7.9)
NEUT %: 74.8 % (ref 47–73)
NRBC BLD QL AUTO: 0 10*3/UL (ref 0–0)
PCO2 BLDA: 35.5 MMHG (ref 35–45)
PH BLDA: 7.44 [PH] (ref 7.35–7.45)
PLATELET # BLD AUTO: 155 10*3/UL (ref 130–400)
PMV BLD AUTO: 10.4 FL (ref 9.6–12.3)
PO2 BLDA: 108 MMHG (ref 80–90)
POTASSIUM SERPL-SCNC: 3.2 MMOL/L (ref 3.5–5.1)
PREALB SERPL-MCNC: 39 MG/DL (ref 20–40)
PROT SERPL-MCNC: 7.4 GM/DL (ref 6.4–8.2)
RBC # BLD AUTO: 3.82 10*6/UL (ref 4.5–5.9)
SAO2 % BLDA: 98.4 % (ref 95–97)
SODIUM SERPL-SCNC: 135 MMOL/L (ref 136–145)
WBC NRBC COR # BLD AUTO: 8.2 10*3/UL (ref 4.8–10.8)

## 2018-05-07 VITALS — DIASTOLIC BLOOD PRESSURE: 76 MMHG | SYSTOLIC BLOOD PRESSURE: 115 MMHG

## 2018-05-07 VITALS — DIASTOLIC BLOOD PRESSURE: 67 MMHG

## 2018-05-07 VITALS — DIASTOLIC BLOOD PRESSURE: 61 MMHG

## 2018-05-07 VITALS — DIASTOLIC BLOOD PRESSURE: 70 MMHG

## 2018-05-07 VITALS — DIASTOLIC BLOOD PRESSURE: 74 MMHG

## 2018-05-07 VITALS — DIASTOLIC BLOOD PRESSURE: 65 MMHG

## 2018-05-07 VITALS — DIASTOLIC BLOOD PRESSURE: 73 MMHG | SYSTOLIC BLOOD PRESSURE: 107 MMHG

## 2018-05-07 VITALS — SYSTOLIC BLOOD PRESSURE: 105 MMHG | DIASTOLIC BLOOD PRESSURE: 61 MMHG

## 2018-05-07 VITALS — DIASTOLIC BLOOD PRESSURE: 68 MMHG

## 2018-05-07 LAB
ALBUMIN SERPL-MCNC: 2.9 GM/DL (ref 3.1–4.5)
ALP SERPL-CCNC: 79 U/L (ref 45–117)
ALT SERPL W P-5'-P-CCNC: 55 U/L (ref 12–78)
APTT PPP: 22.5 SECONDS (ref 20.8–31.5)
AST SERPL-CCNC: 51 IU/L (ref 3–35)
BASE EXCESS BLDA CALC-SCNC: 0.1 MMOL/L (ref -2–2)
BASOPHILS # BLD AUTO: 0 10*3/UL (ref 0–0.1)
BASOPHILS NFR BLD AUTO: 0.3 % (ref 0–1)
BUN SERPL-MCNC: 10 MG/DL (ref 7–24)
CHLORIDE SERPL-SCNC: 97 MMOL/L (ref 98–107)
CREAT SERPL-MCNC: 0.62 MG/DL (ref 0.7–1.3)
EOSINOPHIL # BLD AUTO: 0.3 10*3/UL (ref 0–0.4)
EOSINOPHIL # BLD AUTO: 2.2 % (ref 1–4)
ERYTHROCYTE [DISTWIDTH] IN BLOOD BY AUTOMATED COUNT: 11.8 % (ref 0–14.5)
HCO3 BLDA-SCNC: 23 MMOL/L (ref 22–26)
HCT VFR BLD AUTO: 39.3 % (ref 42–52)
HGB BLD-MCNC: 13.3 G/DL (ref 14–18)
INR BLD: 1 (ref 2–3.5)
LYMPHOCYTES # BLD AUTO: 1.2 10*3/UL (ref 1.3–4.4)
LYMPHOCYTES NFR BLD AUTO: 10.6 % (ref 27–41)
MCH RBC QN AUTO: 35.4 PG (ref 27–31)
MCHC RBC AUTO-ENTMCNC: 33.8 G/DL (ref 33–37)
MCV RBC AUTO: 104.5 FL (ref 80–94)
MONOCYTES # BLD AUTO: 1 10*3/UL (ref 0.1–1)
MONOCYTES NFR BLD MANUAL: 8.7 % (ref 3–9)
NEUT #: 9.1 10*3/UL (ref 2.3–7.9)
NEUT %: 77.9 % (ref 47–73)
NRBC BLD QL AUTO: 0 10*3/UL (ref 0–0)
PCO2 BLDA: 34.9 MMHG (ref 35–45)
PH BLDA: 7.44 [PH] (ref 7.35–7.45)
PLATELET # BLD AUTO: 147 10*3/UL (ref 130–400)
PMV BLD AUTO: 10.1 FL (ref 9.6–12.3)
PO2 BLDA: 97.6 MMHG (ref 80–90)
POTASSIUM SERPL-SCNC: 3.7 MMOL/L (ref 3.5–5.1)
PROT SERPL-MCNC: 7.6 GM/DL (ref 6.4–8.2)
RBC # BLD AUTO: 3.76 10*6/UL (ref 4.5–5.9)
SAO2 % BLDA: 97.4 % (ref 95–97)
SODIUM SERPL-SCNC: 132 MMOL/L (ref 136–145)
WBC NRBC COR # BLD AUTO: 11.7 10*3/UL (ref 4.8–10.8)

## 2018-05-08 VITALS — DIASTOLIC BLOOD PRESSURE: 63 MMHG | SYSTOLIC BLOOD PRESSURE: 98 MMHG

## 2018-05-08 VITALS — SYSTOLIC BLOOD PRESSURE: 103 MMHG | DIASTOLIC BLOOD PRESSURE: 72 MMHG

## 2018-05-08 VITALS — SYSTOLIC BLOOD PRESSURE: 102 MMHG | DIASTOLIC BLOOD PRESSURE: 71 MMHG

## 2018-05-08 VITALS — DIASTOLIC BLOOD PRESSURE: 68 MMHG | SYSTOLIC BLOOD PRESSURE: 100 MMHG

## 2018-05-08 VITALS — DIASTOLIC BLOOD PRESSURE: 69 MMHG

## 2018-05-08 VITALS — DIASTOLIC BLOOD PRESSURE: 68 MMHG

## 2018-05-08 VITALS — DIASTOLIC BLOOD PRESSURE: 67 MMHG | SYSTOLIC BLOOD PRESSURE: 107 MMHG

## 2018-05-08 VITALS — DIASTOLIC BLOOD PRESSURE: 64 MMHG | SYSTOLIC BLOOD PRESSURE: 100 MMHG

## 2018-05-08 VITALS — DIASTOLIC BLOOD PRESSURE: 63 MMHG

## 2018-05-08 VITALS — DIASTOLIC BLOOD PRESSURE: 70 MMHG

## 2018-05-08 LAB
ALBUMIN SERPL-MCNC: 2.5 GM/DL (ref 3.1–4.5)
ALP SERPL-CCNC: 70 U/L (ref 45–117)
ALT SERPL W P-5'-P-CCNC: 40 U/L (ref 12–78)
AST SERPL-CCNC: 34 IU/L (ref 3–35)
BASE EXCESS BLDA CALC-SCNC: 2.9 MMOL/L (ref -2–2)
BASOPHILS # BLD AUTO: 0 10*3/UL (ref 0–0.1)
BASOPHILS NFR BLD AUTO: 0.3 % (ref 0–1)
BUN SERPL-MCNC: 10 MG/DL (ref 7–24)
CHLORIDE SERPL-SCNC: 100 MMOL/L (ref 98–107)
CREAT SERPL-MCNC: 0.41 MG/DL (ref 0.7–1.3)
EOSINOPHIL # BLD AUTO: 0.2 10*3/UL (ref 0–0.4)
EOSINOPHIL # BLD AUTO: 1.9 % (ref 1–4)
ERYTHROCYTE [DISTWIDTH] IN BLOOD BY AUTOMATED COUNT: 11.8 % (ref 0–14.5)
HCO3 BLDA-SCNC: 27 MMOL/L (ref 22–26)
HCT VFR BLD AUTO: 36.1 % (ref 42–52)
HGB BLD-MCNC: 12.2 G/DL (ref 14–18)
LYMPHOCYTES # BLD AUTO: 1.5 10*3/UL (ref 1.3–4.4)
LYMPHOCYTES NFR BLD AUTO: 13.1 % (ref 27–41)
MCH RBC QN AUTO: 34.8 PG (ref 27–31)
MCHC RBC AUTO-ENTMCNC: 33.8 G/DL (ref 33–37)
MCV RBC AUTO: 102.8 FL (ref 80–94)
MONOCYTES # BLD AUTO: 1.4 10*3/UL (ref 0.1–1)
MONOCYTES NFR BLD MANUAL: 11.9 % (ref 3–9)
NEUT #: 8.4 10*3/UL (ref 2.3–7.9)
NEUT %: 72.1 % (ref 47–73)
NRBC BLD QL AUTO: 0 10*3/UL (ref 0–0)
PCO2 BLDA: 41.7 MMHG (ref 35–45)
PH BLDA: 7.43 [PH] (ref 7.35–7.45)
PLATELET # BLD AUTO: 132 10*3/UL (ref 130–400)
PMV BLD AUTO: 10.7 FL (ref 9.6–12.3)
PO2 BLDA: 119 MMHG (ref 80–90)
POTASSIUM SERPL-SCNC: 3.1 MMOL/L (ref 3.5–5.1)
PROT SERPL-MCNC: 7 GM/DL (ref 6.4–8.2)
RBC # BLD AUTO: 3.51 10*6/UL (ref 4.5–5.9)
SAO2 % BLDA: 98.5 % (ref 95–97)
SODIUM SERPL-SCNC: 135 MMOL/L (ref 136–145)
WBC NRBC COR # BLD AUTO: 11.7 10*3/UL (ref 4.8–10.8)

## 2018-05-09 VITALS — DIASTOLIC BLOOD PRESSURE: 93 MMHG | SYSTOLIC BLOOD PRESSURE: 146 MMHG

## 2018-05-09 VITALS — SYSTOLIC BLOOD PRESSURE: 119 MMHG | DIASTOLIC BLOOD PRESSURE: 69 MMHG

## 2018-05-09 VITALS — DIASTOLIC BLOOD PRESSURE: 90 MMHG | SYSTOLIC BLOOD PRESSURE: 152 MMHG

## 2018-05-09 VITALS — DIASTOLIC BLOOD PRESSURE: 69 MMHG

## 2018-05-09 VITALS — SYSTOLIC BLOOD PRESSURE: 113 MMHG | DIASTOLIC BLOOD PRESSURE: 80 MMHG

## 2018-05-09 VITALS — DIASTOLIC BLOOD PRESSURE: 86 MMHG

## 2018-05-09 VITALS — DIASTOLIC BLOOD PRESSURE: 90 MMHG

## 2018-05-09 VITALS — DIASTOLIC BLOOD PRESSURE: 72 MMHG

## 2018-05-09 LAB
APPEARANCE UR: (no result)
BACTERIA #/AREA URNS HPF: (no result) /[HPF]
BASE EXCESS BLDA CALC-SCNC: 1.4 MMOL/L (ref -2–2)
BASE EXCESS BLDA CALC-SCNC: 1.6 MMOL/L (ref -2–2)
BASE EXCESS BLDA CALC-SCNC: 2.1 MMOL/L (ref -2–2)
BILIRUB UR QL STRIP: NEGATIVE
BUN SERPL-MCNC: 15 MG/DL (ref 7–24)
CHLORIDE SERPL-SCNC: 100 MMOL/L (ref 98–107)
COLOR UR: YELLOW
CREAT SERPL-MCNC: 0.56 MG/DL (ref 0.7–1.3)
CRYSTALS URNS MICRO: (no result)
ERYTHROCYTE [DISTWIDTH] IN BLOOD BY AUTOMATED COUNT: 11.7 % (ref 0–14.5)
GLUCOSE UR QL: NEGATIVE
HCO3 BLDA-SCNC: 25.1 MMOL/L (ref 22–26)
HCO3 BLDA-SCNC: 25.2 MMOL/L (ref 22–26)
HCO3 BLDA-SCNC: 25.5 MMOL/L (ref 22–26)
HCT VFR BLD AUTO: 37.3 % (ref 42–52)
HGB BLD-MCNC: 12.6 G/DL (ref 14–18)
HGB UR QL STRIP: (no result)
INR BLD: 1 (ref 2–3.5)
KETONES UR QL STRIP: (no result)
LEUKOCYTE ESTERASE UR QL STRIP: (no result)
MACROCYTES BLD QL SMEAR: SLIGHT
MCH RBC QN AUTO: 35.6 PG (ref 27–31)
MCHC RBC AUTO-ENTMCNC: 33.8 G/DL (ref 33–37)
MCV RBC AUTO: 105.4 FL (ref 80–94)
NITRITE UR QL STRIP: NEGATIVE
NRBC BLD QL AUTO: 0 % (ref 0–0)
PCO2 BLDA: 38.7 MMHG (ref 35–45)
PCO2 BLDA: 39.3 MMHG (ref 35–45)
PCO2 BLDA: 40.1 MMHG (ref 35–45)
PH BLDA: 7.42 [PH] (ref 7.35–7.45)
PH BLDA: 7.43 [PH] (ref 7.35–7.45)
PH BLDA: 7.44 [PH] (ref 7.35–7.45)
PH UR STRIP: 6.5 [PH] (ref 5–9)
PHOSPHATE SERPL-MCNC: 2.2 MG/DL (ref 2.5–4.9)
PLATELET # BLD AUTO: 164 10*3/UL (ref 130–400)
PLATELET SUFFICIENCY: NORMAL
PMV BLD AUTO: 11 FL (ref 9.6–12.3)
PO2 BLDA: 103 MMHG (ref 80–90)
PO2 BLDA: 116 MMHG (ref 80–90)
PO2 BLDA: 83.3 MMHG (ref 80–90)
POTASSIUM SERPL-SCNC: 3.4 MMOL/L (ref 3.5–5.1)
RBC # BLD AUTO: 3.54 10*6/UL (ref 4.5–5.9)
RBC #/AREA URNS HPF: (no result) RBC/HPF (ref 0–2)
SAO2 % BLDA: 96.1 % (ref 95–97)
SAO2 % BLDA: 97.9 % (ref 95–97)
SAO2 % BLDA: 98.6 % (ref 95–97)
SODIUM SERPL-SCNC: 136 MMOL/L (ref 136–145)
SP GR UR: 1.02 (ref 1–1.03)
SPECIMEN PREPARATION: (no result)
TOTAL CELLS COUNTED: 100 #CELLS
UROBILINOGEN UR STRIP-MCNC: 2 E.U./DL (ref 0.2–1)
WBC NRBC COR # BLD AUTO: 14.7 10*3/UL (ref 4.8–10.8)

## 2018-05-10 VITALS — DIASTOLIC BLOOD PRESSURE: 87 MMHG

## 2018-05-10 VITALS — SYSTOLIC BLOOD PRESSURE: 148 MMHG | DIASTOLIC BLOOD PRESSURE: 78 MMHG

## 2018-05-10 VITALS — SYSTOLIC BLOOD PRESSURE: 152 MMHG | DIASTOLIC BLOOD PRESSURE: 90 MMHG

## 2018-05-10 VITALS — SYSTOLIC BLOOD PRESSURE: 138 MMHG | DIASTOLIC BLOOD PRESSURE: 81 MMHG

## 2018-05-10 VITALS — DIASTOLIC BLOOD PRESSURE: 79 MMHG | SYSTOLIC BLOOD PRESSURE: 115 MMHG

## 2018-05-10 VITALS — DIASTOLIC BLOOD PRESSURE: 68 MMHG

## 2018-05-10 VITALS — DIASTOLIC BLOOD PRESSURE: 82 MMHG

## 2018-05-10 LAB
BUN SERPL-MCNC: 5 MG/DL (ref 7–24)
CHLORIDE SERPL-SCNC: 100 MMOL/L (ref 98–107)
CREAT SERPL-MCNC: 0.37 MG/DL (ref 0.7–1.3)
ERYTHROCYTE [DISTWIDTH] IN BLOOD BY AUTOMATED COUNT: 11.3 % (ref 0–14.5)
HCT VFR BLD AUTO: 35.4 % (ref 42–52)
HGB BLD-MCNC: 12.2 G/DL (ref 14–18)
MCH RBC QN AUTO: 34.7 PG (ref 27–31)
MCHC RBC AUTO-ENTMCNC: 34.5 G/DL (ref 33–37)
MCV RBC AUTO: 100.6 FL (ref 80–94)
NRBC BLD QL AUTO: 0 10*3/UL (ref 0–0)
PLATELET # BLD AUTO: 165 10*3/UL (ref 130–400)
PLATELET SUFFICIENCY: NORMAL
PMV BLD AUTO: 9.4 FL (ref 9.6–12.3)
POTASSIUM SERPL-SCNC: 3 MMOL/L (ref 3.5–5.1)
RBC # BLD AUTO: 3.52 10*6/UL (ref 4.5–5.9)
RBC MORPH BLD: NORMAL
SODIUM SERPL-SCNC: 137 MMOL/L (ref 136–145)
TOTAL CELLS COUNTED: 100 #CELLS
WBC NRBC COR # BLD AUTO: 9.1 10*3/UL (ref 4.8–10.8)

## 2018-05-11 VITALS — DIASTOLIC BLOOD PRESSURE: 90 MMHG

## 2018-05-11 VITALS — DIASTOLIC BLOOD PRESSURE: 86 MMHG | SYSTOLIC BLOOD PRESSURE: 139 MMHG

## 2018-05-11 VITALS — DIASTOLIC BLOOD PRESSURE: 58 MMHG

## 2018-05-11 VITALS — DIASTOLIC BLOOD PRESSURE: 74 MMHG | SYSTOLIC BLOOD PRESSURE: 152 MMHG

## 2018-05-11 LAB
BASOPHILS # BLD AUTO: 1 % (ref 0–1)
BUN SERPL-MCNC: 4 MG/DL (ref 7–24)
CHLORIDE SERPL-SCNC: 103 MMOL/L (ref 98–107)
CREAT SERPL-MCNC: 0.34 MG/DL (ref 0.7–1.3)
ERYTHROCYTE [DISTWIDTH] IN BLOOD BY AUTOMATED COUNT: 11.4 % (ref 0–14.5)
HCT VFR BLD AUTO: 34.8 % (ref 42–52)
HGB BLD-MCNC: 11.9 G/DL (ref 14–18)
MACROCYTES BLD QL SMEAR: SLIGHT
MCH RBC QN AUTO: 34.5 PG (ref 27–31)
MCHC RBC AUTO-ENTMCNC: 34.2 G/DL (ref 33–37)
MCV RBC AUTO: 100.9 FL (ref 80–94)
NRBC BLD QL AUTO: 0 % (ref 0–0)
PLATELET # BLD AUTO: 211 10*3/UL (ref 130–400)
PLATELET SUFFICIENCY: NORMAL
PMV BLD AUTO: 9.6 FL (ref 9.6–12.3)
POTASSIUM SERPL-SCNC: 2.6 MMOL/L (ref 3.5–5.1)
RBC # BLD AUTO: 3.45 10*6/UL (ref 4.5–5.9)
SODIUM SERPL-SCNC: 138 MMOL/L (ref 136–145)
TOTAL CELLS COUNTED: 100 #CELLS
TOXIC GRANULES BLD QL SMEAR: SLIGHT
WBC NRBC COR # BLD AUTO: 6.6 10*3/UL (ref 4.8–10.8)

## 2018-05-12 VITALS — DIASTOLIC BLOOD PRESSURE: 96 MMHG | SYSTOLIC BLOOD PRESSURE: 148 MMHG

## 2018-05-12 VITALS — SYSTOLIC BLOOD PRESSURE: 159 MMHG | DIASTOLIC BLOOD PRESSURE: 91 MMHG

## 2018-05-12 VITALS — DIASTOLIC BLOOD PRESSURE: 77 MMHG | SYSTOLIC BLOOD PRESSURE: 146 MMHG

## 2018-05-12 VITALS — DIASTOLIC BLOOD PRESSURE: 80 MMHG

## 2018-05-12 VITALS — DIASTOLIC BLOOD PRESSURE: 83 MMHG

## 2018-05-12 LAB
BASOPHILS # BLD AUTO: 0.1 10*3/UL (ref 0–0.1)
BASOPHILS NFR BLD AUTO: 0.9 % (ref 0–1)
BUN SERPL-MCNC: 4 MG/DL (ref 7–24)
CHLORIDE SERPL-SCNC: 101 MMOL/L (ref 98–107)
CREAT SERPL-MCNC: 0.37 MG/DL (ref 0.7–1.3)
EOSINOPHIL # BLD AUTO: 0.2 10*3/UL (ref 0–0.4)
EOSINOPHIL # BLD AUTO: 3 % (ref 1–4)
ERYTHROCYTE [DISTWIDTH] IN BLOOD BY AUTOMATED COUNT: 11.3 % (ref 0–14.5)
HCT VFR BLD AUTO: 36.2 % (ref 42–52)
HGB BLD-MCNC: 12.5 G/DL (ref 14–18)
LYMPHOCYTES # BLD AUTO: 1.6 10*3/UL (ref 1.3–4.4)
LYMPHOCYTES NFR BLD AUTO: 22.7 % (ref 27–41)
MCH RBC QN AUTO: 34.4 PG (ref 27–31)
MCHC RBC AUTO-ENTMCNC: 34.5 G/DL (ref 33–37)
MCV RBC AUTO: 99.7 FL (ref 80–94)
MONOCYTES # BLD AUTO: 1.3 10*3/UL (ref 0.1–1)
MONOCYTES NFR BLD MANUAL: 19.2 % (ref 3–9)
NEUT #: 3.7 10*3/UL (ref 2.3–7.9)
NEUT %: 53.3 % (ref 47–73)
NRBC BLD QL AUTO: 0 10*3/UL (ref 0–0)
PLATELET # BLD AUTO: 262 10*3/UL (ref 130–400)
PMV BLD AUTO: 9.6 FL (ref 9.6–12.3)
POTASSIUM SERPL-SCNC: 3.2 MMOL/L (ref 3.5–5.1)
RBC # BLD AUTO: 3.63 10*6/UL (ref 4.5–5.9)
SODIUM SERPL-SCNC: 135 MMOL/L (ref 136–145)
WBC NRBC COR # BLD AUTO: 7 10*3/UL (ref 4.8–10.8)

## 2018-05-12 PROCEDURE — 0BC68ZZ EXTIRPATION OF MATTER FROM RIGHT LOWER LOBE BRONCHUS, VIA NATURAL OR ARTIFICIAL OPENING ENDOSCOPIC: ICD-10-PCS | Performed by: INTERNAL MEDICINE

## 2018-05-12 PROCEDURE — 0BC38ZZ EXTIRPATION OF MATTER FROM RIGHT MAIN BRONCHUS, VIA NATURAL OR ARTIFICIAL OPENING ENDOSCOPIC: ICD-10-PCS | Performed by: INTERNAL MEDICINE

## 2018-05-12 PROCEDURE — 0BC48ZZ EXTIRPATION OF MATTER FROM RIGHT UPPER LOBE BRONCHUS, VIA NATURAL OR ARTIFICIAL OPENING ENDOSCOPIC: ICD-10-PCS | Performed by: INTERNAL MEDICINE

## 2018-05-12 PROCEDURE — 0BC88ZZ EXTIRPATION OF MATTER FROM LEFT UPPER LOBE BRONCHUS, VIA NATURAL OR ARTIFICIAL OPENING ENDOSCOPIC: ICD-10-PCS | Performed by: INTERNAL MEDICINE

## 2018-05-12 PROCEDURE — 0BC58ZZ EXTIRPATION OF MATTER FROM RIGHT MIDDLE LOBE BRONCHUS, VIA NATURAL OR ARTIFICIAL OPENING ENDOSCOPIC: ICD-10-PCS | Performed by: INTERNAL MEDICINE

## 2018-05-12 PROCEDURE — 0BC98ZZ EXTIRPATION OF MATTER FROM LINGULA BRONCHUS, VIA NATURAL OR ARTIFICIAL OPENING ENDOSCOPIC: ICD-10-PCS | Performed by: INTERNAL MEDICINE

## 2018-05-12 PROCEDURE — 0BC78ZZ EXTIRPATION OF MATTER FROM LEFT MAIN BRONCHUS, VIA NATURAL OR ARTIFICIAL OPENING ENDOSCOPIC: ICD-10-PCS | Performed by: INTERNAL MEDICINE

## 2018-05-12 PROCEDURE — 0BCB8ZZ EXTIRPATION OF MATTER FROM LEFT LOWER LOBE BRONCHUS, VIA NATURAL OR ARTIFICIAL OPENING ENDOSCOPIC: ICD-10-PCS | Performed by: INTERNAL MEDICINE

## 2018-05-12 PROCEDURE — 0BC18ZZ EXTIRPATION OF MATTER FROM TRACHEA, VIA NATURAL OR ARTIFICIAL OPENING ENDOSCOPIC: ICD-10-PCS | Performed by: INTERNAL MEDICINE

## 2018-05-13 VITALS — DIASTOLIC BLOOD PRESSURE: 78 MMHG

## 2018-05-13 VITALS — SYSTOLIC BLOOD PRESSURE: 147 MMHG | DIASTOLIC BLOOD PRESSURE: 80 MMHG

## 2018-05-13 VITALS — DIASTOLIC BLOOD PRESSURE: 87 MMHG

## 2018-05-13 LAB
BUN SERPL-MCNC: 4 MG/DL (ref 7–24)
CHLORIDE SERPL-SCNC: 96 MMOL/L (ref 98–107)
CREAT SERPL-MCNC: 0.41 MG/DL (ref 0.7–1.3)
POTASSIUM SERPL-SCNC: 3.2 MMOL/L (ref 3.5–5.1)
SODIUM SERPL-SCNC: 132 MMOL/L (ref 136–145)

## 2018-06-14 ENCOUNTER — HOSPITAL ENCOUNTER (INPATIENT)
Dept: HOSPITAL 83 - ED | Age: 58
LOS: 7 days | Discharge: HOME | DRG: 641 | End: 2018-06-21
Attending: INTERNAL MEDICINE | Admitting: INTERNAL MEDICINE
Payer: COMMERCIAL

## 2018-06-14 VITALS — BODY MASS INDEX: 25.51 KG/M2 | HEIGHT: 65.98 IN | WEIGHT: 158.73 LBS

## 2018-06-14 VITALS — DIASTOLIC BLOOD PRESSURE: 90 MMHG

## 2018-06-14 VITALS — SYSTOLIC BLOOD PRESSURE: 153 MMHG | DIASTOLIC BLOOD PRESSURE: 105 MMHG

## 2018-06-14 VITALS — SYSTOLIC BLOOD PRESSURE: 142 MMHG | DIASTOLIC BLOOD PRESSURE: 87 MMHG

## 2018-06-14 VITALS — DIASTOLIC BLOOD PRESSURE: 105 MMHG | SYSTOLIC BLOOD PRESSURE: 173 MMHG

## 2018-06-14 VITALS — DIASTOLIC BLOOD PRESSURE: 96 MMHG

## 2018-06-14 DIAGNOSIS — Z79.899: ICD-10-CM

## 2018-06-14 DIAGNOSIS — E78.2: ICD-10-CM

## 2018-06-14 DIAGNOSIS — F10.231: ICD-10-CM

## 2018-06-14 DIAGNOSIS — E83.42: ICD-10-CM

## 2018-06-14 DIAGNOSIS — E87.1: ICD-10-CM

## 2018-06-14 DIAGNOSIS — R82.4: ICD-10-CM

## 2018-06-14 DIAGNOSIS — Z72.0: ICD-10-CM

## 2018-06-14 DIAGNOSIS — D69.6: ICD-10-CM

## 2018-06-14 DIAGNOSIS — R11.0: ICD-10-CM

## 2018-06-14 DIAGNOSIS — F32.9: ICD-10-CM

## 2018-06-14 DIAGNOSIS — R94.31: ICD-10-CM

## 2018-06-14 DIAGNOSIS — I11.9: ICD-10-CM

## 2018-06-14 DIAGNOSIS — Z82.49: ICD-10-CM

## 2018-06-14 DIAGNOSIS — E03.9: ICD-10-CM

## 2018-06-14 DIAGNOSIS — F41.9: ICD-10-CM

## 2018-06-14 DIAGNOSIS — R74.0: ICD-10-CM

## 2018-06-14 DIAGNOSIS — R80.9: ICD-10-CM

## 2018-06-14 DIAGNOSIS — R07.81: ICD-10-CM

## 2018-06-14 DIAGNOSIS — E66.3: ICD-10-CM

## 2018-06-14 DIAGNOSIS — Z79.82: ICD-10-CM

## 2018-06-14 DIAGNOSIS — E87.6: Primary | ICD-10-CM

## 2018-06-14 DIAGNOSIS — K21.9: ICD-10-CM

## 2018-06-14 DIAGNOSIS — R71.8: ICD-10-CM

## 2018-06-14 LAB
ALBUMIN SERPL-MCNC: 3.9 GM/DL (ref 3.1–4.5)
ALP SERPL-CCNC: 96 U/L (ref 45–117)
ALT SERPL W P-5'-P-CCNC: 62 U/L (ref 12–78)
APTT PPP: 22.6 SECONDS (ref 20.8–31.5)
AST SERPL-CCNC: 115 IU/L (ref 3–35)
BASOPHILS # BLD AUTO: 0 10*3/UL (ref 0–0.1)
BASOPHILS NFR BLD AUTO: 0.7 % (ref 0–1)
BUN SERPL-MCNC: 8 MG/DL (ref 7–24)
CHLORIDE SERPL-SCNC: 96 MMOL/L (ref 98–107)
CREAT SERPL-MCNC: 0.9 MG/DL (ref 0.7–1.3)
EOSINOPHIL # BLD AUTO: 0 10*3/UL (ref 0–0.4)
EOSINOPHIL # BLD AUTO: 0.2 % (ref 1–4)
ERYTHROCYTE [DISTWIDTH] IN BLOOD BY AUTOMATED COUNT: 11.8 % (ref 0–14.5)
ETHANOL SERPL-MCNC: < 3 MG/DL (ref ?–3)
HCT VFR BLD AUTO: 40.3 % (ref 42–52)
HGB BLD-MCNC: 14.2 G/DL (ref 14–18)
INR BLD: 1.2 (ref 2–3.5)
LYMPHOCYTES # BLD AUTO: 0.5 10*3/UL (ref 1.3–4.4)
LYMPHOCYTES NFR BLD AUTO: 10.7 % (ref 27–41)
MCH RBC QN AUTO: 34.1 PG (ref 27–31)
MCHC RBC AUTO-ENTMCNC: 35.2 G/DL (ref 33–37)
MCV RBC AUTO: 96.6 FL (ref 80–94)
MONOCYTES # BLD AUTO: 0.4 10*3/UL (ref 0.1–1)
MONOCYTES NFR BLD MANUAL: 10 % (ref 3–9)
NEUT #: 3.4 10*3/UL (ref 2.3–7.9)
NEUT %: 77.9 % (ref 47–73)
NRBC BLD QL AUTO: 0 % (ref 0–0)
PLATELET # BLD AUTO: 107 10*3/UL (ref 130–400)
PMV BLD AUTO: 10 FL (ref 9.6–12.3)
POTASSIUM SERPL-SCNC: 3.2 MMOL/L (ref 3.5–5.1)
PROT SERPL-MCNC: 8.7 GM/DL (ref 6.4–8.2)
RBC # BLD AUTO: 4.17 10*6/UL (ref 4.5–5.9)
SODIUM SERPL-SCNC: 129 MMOL/L (ref 136–145)
WBC NRBC COR # BLD AUTO: 4.4 10*3/UL (ref 4.8–10.8)

## 2018-06-15 VITALS — DIASTOLIC BLOOD PRESSURE: 86 MMHG

## 2018-06-15 VITALS — DIASTOLIC BLOOD PRESSURE: 75 MMHG

## 2018-06-15 VITALS — DIASTOLIC BLOOD PRESSURE: 60 MMHG | SYSTOLIC BLOOD PRESSURE: 110 MMHG

## 2018-06-15 VITALS — DIASTOLIC BLOOD PRESSURE: 74 MMHG | SYSTOLIC BLOOD PRESSURE: 132 MMHG

## 2018-06-15 VITALS — DIASTOLIC BLOOD PRESSURE: 88 MMHG

## 2018-06-15 VITALS — DIASTOLIC BLOOD PRESSURE: 85 MMHG

## 2018-06-15 LAB
ALBUMIN SERPL-MCNC: 3.3 GM/DL (ref 3.1–4.5)
ALP SERPL-CCNC: 75 U/L (ref 45–117)
ALT SERPL W P-5'-P-CCNC: 46 U/L (ref 12–78)
AMPHETAMINES UR QL SCN: < 1000
APPEARANCE UR: (no result)
AST SERPL-CCNC: 72 IU/L (ref 3–35)
BARBITURATES UR QL SCN: < 200
BASOPHILS # BLD AUTO: 0 10*3/UL (ref 0–0.1)
BASOPHILS NFR BLD AUTO: 0.5 % (ref 0–1)
BENZODIAZ UR QL SCN: > 200
BILIRUB UR QL STRIP: NEGATIVE
BUN SERPL-MCNC: 7 MG/DL (ref 7–24)
BZE UR QL SCN: < 300
CANNABINOIDS UR QL SCN: < 50
CASTS URNS QL MICRO: (no result)
CHLORIDE SERPL-SCNC: 101 MMOL/L (ref 98–107)
COLOR UR: YELLOW
CREAT SERPL-MCNC: 0.52 MG/DL (ref 0.7–1.3)
EOSINOPHIL # BLD AUTO: 0.1 10*3/UL (ref 0–0.4)
EOSINOPHIL # BLD AUTO: 2 % (ref 1–4)
ERYTHROCYTE [DISTWIDTH] IN BLOOD BY AUTOMATED COUNT: 11.9 % (ref 0–14.5)
GLUCOSE UR QL: NEGATIVE
HCT VFR BLD AUTO: 37 % (ref 42–52)
HGB BLD-MCNC: 12.7 G/DL (ref 14–18)
HGB UR QL STRIP: (no result)
KETONES UR QL STRIP: (no result)
LEUKOCYTE ESTERASE UR QL STRIP: NEGATIVE
LYMPHOCYTES # BLD AUTO: 1.4 10*3/UL (ref 1.3–4.4)
LYMPHOCYTES NFR BLD AUTO: 34.4 % (ref 27–41)
MCH RBC QN AUTO: 33.9 PG (ref 27–31)
MCHC RBC AUTO-ENTMCNC: 34.3 G/DL (ref 33–37)
MCV RBC AUTO: 98.7 FL (ref 80–94)
METHADONE UR QL SCN: < 300
MONOCYTES # BLD AUTO: 0.5 10*3/UL (ref 0.1–1)
MONOCYTES NFR BLD MANUAL: 12.5 % (ref 3–9)
NEUT #: 2 10*3/UL (ref 2.3–7.9)
NEUT %: 50.3 % (ref 47–73)
NITRITE UR QL STRIP: NEGATIVE
NRBC BLD QL AUTO: 0 % (ref 0–0)
OPIATES UR QL SCN: < 300
PCP UR QL SCN: <  25
PH UR STRIP: 6 [PH] (ref 5–9)
PHOSPHATE SERPL-MCNC: 2.5 MG/DL (ref 2.5–4.9)
PLATELET # BLD AUTO: 97 10*3/UL (ref 130–400)
PMV BLD AUTO: 10.8 FL (ref 9.6–12.3)
POTASSIUM SERPL-SCNC: 2.9 MMOL/L (ref 3.5–5.1)
PROT SERPL-MCNC: 7.1 GM/DL (ref 6.4–8.2)
RBC # BLD AUTO: 3.75 10*6/UL (ref 4.5–5.9)
RBC #/AREA URNS HPF: (no result) RBC/HPF (ref 0–2)
SODIUM SERPL-SCNC: 133 MMOL/L (ref 136–145)
SP GR UR: 1.02 (ref 1–1.03)
UROBILINOGEN UR STRIP-MCNC: 0.2 E.U./DL (ref 0.2–1)
WBC #/AREA URNS HPF: (no result) WBC/HPF (ref 0–5)
WBC NRBC COR # BLD AUTO: 3.9 10*3/UL (ref 4.8–10.8)

## 2018-06-16 VITALS — DIASTOLIC BLOOD PRESSURE: 76 MMHG

## 2018-06-16 VITALS — SYSTOLIC BLOOD PRESSURE: 143 MMHG | DIASTOLIC BLOOD PRESSURE: 89 MMHG

## 2018-06-16 VITALS — DIASTOLIC BLOOD PRESSURE: 99 MMHG

## 2018-06-16 VITALS — DIASTOLIC BLOOD PRESSURE: 88 MMHG

## 2018-06-16 VITALS — DIASTOLIC BLOOD PRESSURE: 97 MMHG

## 2018-06-16 LAB
ALBUMIN SERPL-MCNC: 3.7 GM/DL (ref 3.1–4.5)
ALP SERPL-CCNC: 78 U/L (ref 45–117)
ALT SERPL W P-5'-P-CCNC: 49 U/L (ref 12–78)
AST SERPL-CCNC: 70 IU/L (ref 3–35)
BASOPHILS # BLD AUTO: 0 10*3/UL (ref 0–0.1)
BASOPHILS NFR BLD AUTO: 0.4 % (ref 0–1)
BUN SERPL-MCNC: 5 MG/DL (ref 7–24)
CHLORIDE SERPL-SCNC: 104 MMOL/L (ref 98–107)
CREAT SERPL-MCNC: 0.59 MG/DL (ref 0.7–1.3)
EOSINOPHIL # BLD AUTO: 0.1 10*3/UL (ref 0–0.4)
EOSINOPHIL # BLD AUTO: 1.2 % (ref 1–4)
ERYTHROCYTE [DISTWIDTH] IN BLOOD BY AUTOMATED COUNT: 12 % (ref 0–14.5)
HCT VFR BLD AUTO: 38.9 % (ref 42–52)
HGB BLD-MCNC: 13.2 G/DL (ref 14–18)
LYMPHOCYTES # BLD AUTO: 1.5 10*3/UL (ref 1.3–4.4)
LYMPHOCYTES NFR BLD AUTO: 19.1 % (ref 27–41)
MCH RBC QN AUTO: 33.7 PG (ref 27–31)
MCHC RBC AUTO-ENTMCNC: 33.9 G/DL (ref 33–37)
MCV RBC AUTO: 99.2 FL (ref 80–94)
MONOCYTES # BLD AUTO: 0.8 10*3/UL (ref 0.1–1)
MONOCYTES NFR BLD MANUAL: 10.2 % (ref 3–9)
NEUT #: 5.3 10*3/UL (ref 2.3–7.9)
NEUT %: 68.6 % (ref 47–73)
NRBC BLD QL AUTO: 0 % (ref 0–0)
PHOSPHATE SERPL-MCNC: 1.5 MG/DL (ref 2.5–4.9)
PLATELET # BLD AUTO: 113 10*3/UL (ref 130–400)
PMV BLD AUTO: 11.2 FL (ref 9.6–12.3)
POTASSIUM SERPL-SCNC: 3.1 MMOL/L (ref 3.5–5.1)
PROT SERPL-MCNC: 7.9 GM/DL (ref 6.4–8.2)
RBC # BLD AUTO: 3.92 10*6/UL (ref 4.5–5.9)
SODIUM SERPL-SCNC: 134 MMOL/L (ref 136–145)
WBC NRBC COR # BLD AUTO: 7.7 10*3/UL (ref 4.8–10.8)

## 2018-06-17 VITALS — DIASTOLIC BLOOD PRESSURE: 62 MMHG

## 2018-06-17 VITALS — DIASTOLIC BLOOD PRESSURE: 70 MMHG

## 2018-06-17 VITALS — SYSTOLIC BLOOD PRESSURE: 165 MMHG | DIASTOLIC BLOOD PRESSURE: 78 MMHG

## 2018-06-17 VITALS — DIASTOLIC BLOOD PRESSURE: 82 MMHG

## 2018-06-17 VITALS — SYSTOLIC BLOOD PRESSURE: 129 MMHG | DIASTOLIC BLOOD PRESSURE: 65 MMHG

## 2018-06-17 VITALS — DIASTOLIC BLOOD PRESSURE: 84 MMHG

## 2018-06-17 LAB
ALBUMIN SERPL-MCNC: 3.3 GM/DL (ref 3.1–4.5)
ALP SERPL-CCNC: 61 U/L (ref 45–117)
ALT SERPL W P-5'-P-CCNC: 42 U/L (ref 12–78)
AST SERPL-CCNC: 53 IU/L (ref 3–35)
BASOPHILS # BLD AUTO: 0 10*3/UL (ref 0–0.1)
BASOPHILS NFR BLD AUTO: 0.5 % (ref 0–1)
BUN SERPL-MCNC: 4 MG/DL (ref 7–24)
CHLORIDE SERPL-SCNC: 105 MMOL/L (ref 98–107)
CREAT SERPL-MCNC: 0.45 MG/DL (ref 0.7–1.3)
EOSINOPHIL # BLD AUTO: 0.1 10*3/UL (ref 0–0.4)
EOSINOPHIL # BLD AUTO: 2 % (ref 1–4)
ERYTHROCYTE [DISTWIDTH] IN BLOOD BY AUTOMATED COUNT: 12.1 % (ref 0–14.5)
HCT VFR BLD AUTO: 36.4 % (ref 42–52)
HGB BLD-MCNC: 12.3 G/DL (ref 14–18)
LYMPHOCYTES # BLD AUTO: 1.4 10*3/UL (ref 1.3–4.4)
LYMPHOCYTES NFR BLD AUTO: 20.7 % (ref 27–41)
MCH RBC QN AUTO: 33.6 PG (ref 27–31)
MCHC RBC AUTO-ENTMCNC: 33.8 G/DL (ref 33–37)
MCV RBC AUTO: 99.5 FL (ref 80–94)
MONOCYTES # BLD AUTO: 0.8 10*3/UL (ref 0.1–1)
MONOCYTES NFR BLD MANUAL: 11.8 % (ref 3–9)
NEUT #: 4.3 10*3/UL (ref 2.3–7.9)
NEUT %: 64.5 % (ref 47–73)
NRBC BLD QL AUTO: 0 10*3/UL (ref 0–0)
PHOSPHATE SERPL-MCNC: 2.8 MG/DL (ref 2.5–4.9)
PLATELET # BLD AUTO: 119 10*3/UL (ref 130–400)
PMV BLD AUTO: 11.1 FL (ref 9.6–12.3)
POTASSIUM SERPL-SCNC: 3.5 MMOL/L (ref 3.5–5.1)
PROT SERPL-MCNC: 7.1 GM/DL (ref 6.4–8.2)
RBC # BLD AUTO: 3.66 10*6/UL (ref 4.5–5.9)
SODIUM SERPL-SCNC: 137 MMOL/L (ref 136–145)
WBC NRBC COR # BLD AUTO: 6.6 10*3/UL (ref 4.8–10.8)

## 2018-06-18 VITALS — DIASTOLIC BLOOD PRESSURE: 83 MMHG

## 2018-06-18 VITALS — SYSTOLIC BLOOD PRESSURE: 148 MMHG | DIASTOLIC BLOOD PRESSURE: 80 MMHG

## 2018-06-18 VITALS — DIASTOLIC BLOOD PRESSURE: 90 MMHG

## 2018-06-18 VITALS — DIASTOLIC BLOOD PRESSURE: 80 MMHG | SYSTOLIC BLOOD PRESSURE: 140 MMHG

## 2018-06-18 VITALS — DIASTOLIC BLOOD PRESSURE: 85 MMHG

## 2018-06-18 VITALS — DIASTOLIC BLOOD PRESSURE: 72 MMHG | SYSTOLIC BLOOD PRESSURE: 132 MMHG

## 2018-06-18 LAB
BASOPHILS # BLD AUTO: 0 10*3/UL (ref 0–0.1)
BASOPHILS NFR BLD AUTO: 0.4 % (ref 0–1)
BUN SERPL-MCNC: 4 MG/DL (ref 7–24)
CHLORIDE SERPL-SCNC: 103 MMOL/L (ref 98–107)
CREAT SERPL-MCNC: 0.37 MG/DL (ref 0.7–1.3)
EOSINOPHIL # BLD AUTO: 0.2 10*3/UL (ref 0–0.4)
EOSINOPHIL # BLD AUTO: 3.3 % (ref 1–4)
ERYTHROCYTE [DISTWIDTH] IN BLOOD BY AUTOMATED COUNT: 12.5 % (ref 0–14.5)
HCT VFR BLD AUTO: 35.2 % (ref 42–52)
HGB BLD-MCNC: 12 G/DL (ref 14–18)
LYMPHOCYTES # BLD AUTO: 1.5 10*3/UL (ref 1.3–4.4)
LYMPHOCYTES NFR BLD AUTO: 28.2 % (ref 27–41)
MCH RBC QN AUTO: 34.4 PG (ref 27–31)
MCHC RBC AUTO-ENTMCNC: 34.1 G/DL (ref 33–37)
MCV RBC AUTO: 100.9 FL (ref 80–94)
MONOCYTES # BLD AUTO: 0.6 10*3/UL (ref 0.1–1)
MONOCYTES NFR BLD MANUAL: 11.9 % (ref 3–9)
NEUT #: 2.9 10*3/UL (ref 2.3–7.9)
NEUT %: 55.8 % (ref 47–73)
NRBC BLD QL AUTO: 0 10*3/UL (ref 0–0)
PLATELET # BLD AUTO: 124 10*3/UL (ref 130–400)
PMV BLD AUTO: 10.7 FL (ref 9.6–12.3)
POTASSIUM SERPL-SCNC: 3.3 MMOL/L (ref 3.5–5.1)
RBC # BLD AUTO: 3.49 10*6/UL (ref 4.5–5.9)
SODIUM SERPL-SCNC: 136 MMOL/L (ref 136–145)
WBC NRBC COR # BLD AUTO: 5.2 10*3/UL (ref 4.8–10.8)

## 2018-06-19 VITALS — DIASTOLIC BLOOD PRESSURE: 63 MMHG

## 2018-06-19 VITALS — DIASTOLIC BLOOD PRESSURE: 65 MMHG

## 2018-06-19 VITALS — SYSTOLIC BLOOD PRESSURE: 133 MMHG | DIASTOLIC BLOOD PRESSURE: 75 MMHG

## 2018-06-19 VITALS — DIASTOLIC BLOOD PRESSURE: 94 MMHG

## 2018-06-19 VITALS — DIASTOLIC BLOOD PRESSURE: 84 MMHG | SYSTOLIC BLOOD PRESSURE: 145 MMHG

## 2018-06-19 VITALS — DIASTOLIC BLOOD PRESSURE: 88 MMHG

## 2018-06-19 VITALS — DIASTOLIC BLOOD PRESSURE: 62 MMHG

## 2018-06-20 VITALS — DIASTOLIC BLOOD PRESSURE: 79 MMHG | SYSTOLIC BLOOD PRESSURE: 144 MMHG

## 2018-06-20 VITALS — DIASTOLIC BLOOD PRESSURE: 83 MMHG

## 2018-06-20 VITALS — DIASTOLIC BLOOD PRESSURE: 95 MMHG

## 2018-06-20 LAB
ALBUMIN SERPL-MCNC: 3.3 GM/DL (ref 3.1–4.5)
ALP SERPL-CCNC: 69 U/L (ref 45–117)
ALT SERPL W P-5'-P-CCNC: 41 U/L (ref 12–78)
AST SERPL-CCNC: 37 IU/L (ref 3–35)
BUN SERPL-MCNC: 4 MG/DL (ref 7–24)
CHLORIDE SERPL-SCNC: 104 MMOL/L (ref 98–107)
CREAT SERPL-MCNC: 0.44 MG/DL (ref 0.7–1.3)
ERYTHROCYTE [DISTWIDTH] IN BLOOD BY AUTOMATED COUNT: 12.5 % (ref 0–14.5)
HCT VFR BLD AUTO: 40.1 % (ref 42–52)
HGB BLD-MCNC: 13.7 G/DL (ref 14–18)
LV EF: 67 %
LVEF MODALITY: NORMAL
MCH RBC QN AUTO: 33.8 PG (ref 27–31)
MCHC RBC AUTO-ENTMCNC: 34.2 G/DL (ref 33–37)
MCV RBC AUTO: 99 FL (ref 80–94)
NRBC BLD QL AUTO: 0 % (ref 0–0)
PHOSPHATE SERPL-MCNC: 3 MG/DL (ref 2.5–4.9)
PLATELET # BLD AUTO: 193 10*3/UL (ref 130–400)
PLATELET SUFFICIENCY: NORMAL
PMV BLD AUTO: 9.8 FL (ref 9.6–12.3)
POTASSIUM SERPL-SCNC: 3.4 MMOL/L (ref 3.5–5.1)
PROT SERPL-MCNC: 7.7 GM/DL (ref 6.4–8.2)
RBC # BLD AUTO: 4.05 10*6/UL (ref 4.5–5.9)
RBC MORPH BLD: NORMAL
SODIUM SERPL-SCNC: 138 MMOL/L (ref 136–145)
TOTAL CELLS COUNTED: 100 #CELLS
TROPONIN I: < 0.015 NG/ML
WBC NRBC COR # BLD AUTO: 4.4 10*3/UL (ref 4.8–10.8)

## 2018-06-20 PROCEDURE — 3E073KZ INTRODUCTION OF OTHER DIAGNOSTIC SUBSTANCE INTO CORONARY ARTERY, PERCUTANEOUS APPROACH: ICD-10-PCS

## 2018-06-20 PROCEDURE — 4A02XM4 MEASUREMENT OF CARDIAC TOTAL ACTIVITY, EXTERNAL APPROACH: ICD-10-PCS

## 2018-06-21 VITALS — DIASTOLIC BLOOD PRESSURE: 80 MMHG

## 2018-06-21 VITALS — DIASTOLIC BLOOD PRESSURE: 88 MMHG

## 2018-06-21 LAB
BUN SERPL-MCNC: 4 MG/DL (ref 7–24)
CHLORIDE SERPL-SCNC: 100 MMOL/L (ref 98–107)
CREAT SERPL-MCNC: 0.54 MG/DL (ref 0.7–1.3)
ERYTHROCYTE [DISTWIDTH] IN BLOOD BY AUTOMATED COUNT: 12.5 % (ref 0–14.5)
HCT VFR BLD AUTO: 38.5 % (ref 42–52)
HGB BLD-MCNC: 13.3 G/DL (ref 14–18)
MCH RBC QN AUTO: 34.5 PG (ref 27–31)
MCHC RBC AUTO-ENTMCNC: 34.5 G/DL (ref 33–37)
MCV RBC AUTO: 99.7 FL (ref 80–94)
NRBC BLD QL AUTO: 0 10*3/UL (ref 0–0)
PHOSPHATE SERPL-MCNC: 3.4 MG/DL (ref 2.5–4.9)
PLATELET # BLD AUTO: 214 10*3/UL (ref 130–400)
PLATELET SUFFICIENCY: NORMAL
PMV BLD AUTO: 9.3 FL (ref 9.6–12.3)
POTASSIUM SERPL-SCNC: 3.6 MMOL/L (ref 3.5–5.1)
RBC # BLD AUTO: 3.86 10*6/UL (ref 4.5–5.9)
RBC MORPH BLD: NORMAL
SODIUM SERPL-SCNC: 134 MMOL/L (ref 136–145)
TOTAL CELLS COUNTED: 100 #CELLS
WBC NRBC COR # BLD AUTO: 6 10*3/UL (ref 4.8–10.8)

## 2018-07-05 ENCOUNTER — HOSPITAL ENCOUNTER (EMERGENCY)
Age: 58
Discharge: HOME | End: 2018-07-05
Attending: EMERGENCY MEDICINE
Payer: MEDICAID

## 2018-07-05 VITALS
WEIGHT: 165 LBS | HEART RATE: 87 BPM | SYSTOLIC BLOOD PRESSURE: 120 MMHG | TEMPERATURE: 98 F | RESPIRATION RATE: 16 BRPM | DIASTOLIC BLOOD PRESSURE: 82 MMHG | OXYGEN SATURATION: 99 %

## 2018-07-05 DIAGNOSIS — R21 RASH AND NONSPECIFIC SKIN ERUPTION: Primary | ICD-10-CM

## 2018-07-05 DIAGNOSIS — J00 RHINOPHARYNGITIS: ICD-10-CM

## 2018-07-05 DIAGNOSIS — F41.1 ANXIETY STATE: ICD-10-CM

## 2018-07-05 PROCEDURE — 99282 EMERGENCY DEPT VISIT SF MDM: CPT

## 2018-07-05 PROCEDURE — G0381 LEV 2 HOSP TYPE B ED VISIT: HCPCS

## 2018-07-05 RX ORDER — OMEPRAZOLE 10 MG/1
10 CAPSULE, DELAYED RELEASE ORAL 2 TIMES DAILY
COMMUNITY

## 2018-07-05 RX ORDER — HYDROXYZINE PAMOATE 25 MG/1
25 CAPSULE ORAL 4 TIMES DAILY PRN
COMMUNITY

## 2018-07-05 RX ORDER — LANOLIN ALCOHOL/MO/W.PET/CERES
3 CREAM (GRAM) TOPICAL DAILY
COMMUNITY

## 2018-09-03 ENCOUNTER — HOSPITAL ENCOUNTER (EMERGENCY)
Dept: HOSPITAL 83 - ED | Age: 58
Discharge: HOME | End: 2018-09-03
Payer: COMMERCIAL

## 2018-09-03 VITALS — HEIGHT: 65.98 IN | BODY MASS INDEX: 24.91 KG/M2 | WEIGHT: 155 LBS

## 2018-09-03 DIAGNOSIS — E03.9: ICD-10-CM

## 2018-09-03 DIAGNOSIS — S70.01XA: Primary | ICD-10-CM

## 2018-09-03 DIAGNOSIS — Z79.899: ICD-10-CM
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
## 2018-09-03 DIAGNOSIS — F17.200: ICD-10-CM
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
## 2018-09-03 DIAGNOSIS — E78.2: ICD-10-CM

## 2018-09-03 DIAGNOSIS — Z79.82: ICD-10-CM

## 2018-09-03 DIAGNOSIS — M54.5: ICD-10-CM

## 2018-09-03 DIAGNOSIS — K21.9: ICD-10-CM

## 2018-09-03 DIAGNOSIS — I10: ICD-10-CM

## 2018-09-03 LAB
AMPHETAMINES UR QL SCN: < 1000
APPEARANCE UR: CLEAR
BARBITURATES UR QL SCN: < 200
BENZODIAZ UR QL SCN: < 200
BILIRUB UR QL STRIP: NEGATIVE
BZE UR QL SCN: < 300
CANNABINOIDS UR QL SCN: < 50
COLOR UR: YELLOW
EPI CELLS #/AREA URNS HPF: (no result) /[HPF]
GLUCOSE UR QL: NEGATIVE
HGB UR QL STRIP: NEGATIVE
KETONES UR QL STRIP: NEGATIVE
LEUKOCYTE ESTERASE UR QL STRIP: NEGATIVE
METHADONE UR QL SCN: < 300
NITRITE UR QL STRIP: NEGATIVE
OPIATES UR QL SCN: < 300
PCP UR QL SCN: <  25
PH UR STRIP: 6.5 [PH] (ref 5–9)
SP GR UR: <= 1.005 (ref 1–1.03)
UROBILINOGEN UR STRIP-MCNC: 0.2 E.U./DL (ref 0.2–1)
WBC #/AREA URNS HPF: (no result) WBC/HPF (ref 0–5)

## 2019-02-16 ENCOUNTER — HOSPITAL ENCOUNTER (INPATIENT)
Dept: HOSPITAL 83 - ED | Age: 59
LOS: 10 days | Discharge: HOME | DRG: 640 | End: 2019-02-26
Attending: INTERNAL MEDICINE | Admitting: INTERNAL MEDICINE
Payer: COMMERCIAL

## 2019-02-16 VITALS — DIASTOLIC BLOOD PRESSURE: 96 MMHG

## 2019-02-16 VITALS — BODY MASS INDEX: 27.03 KG/M2 | HEIGHT: 65.98 IN | WEIGHT: 168.19 LBS

## 2019-02-16 VITALS — DIASTOLIC BLOOD PRESSURE: 66 MMHG

## 2019-02-16 VITALS — DIASTOLIC BLOOD PRESSURE: 98 MMHG

## 2019-02-16 VITALS — SYSTOLIC BLOOD PRESSURE: 172 MMHG | DIASTOLIC BLOOD PRESSURE: 98 MMHG

## 2019-02-16 VITALS — DIASTOLIC BLOOD PRESSURE: 76 MMHG

## 2019-02-16 VITALS — DIASTOLIC BLOOD PRESSURE: 97 MMHG

## 2019-02-16 VITALS — DIASTOLIC BLOOD PRESSURE: 88 MMHG

## 2019-02-16 DIAGNOSIS — Z82.49: ICD-10-CM

## 2019-02-16 DIAGNOSIS — Z79.899: ICD-10-CM

## 2019-02-16 DIAGNOSIS — J10.08: ICD-10-CM

## 2019-02-16 DIAGNOSIS — F17.210: ICD-10-CM

## 2019-02-16 DIAGNOSIS — F41.9: ICD-10-CM

## 2019-02-16 DIAGNOSIS — E03.9: ICD-10-CM

## 2019-02-16 DIAGNOSIS — F10.239: ICD-10-CM

## 2019-02-16 DIAGNOSIS — E87.8: ICD-10-CM

## 2019-02-16 DIAGNOSIS — A41.9: ICD-10-CM

## 2019-02-16 DIAGNOSIS — E87.6: ICD-10-CM

## 2019-02-16 DIAGNOSIS — K21.9: ICD-10-CM

## 2019-02-16 DIAGNOSIS — E87.1: Primary | ICD-10-CM

## 2019-02-16 DIAGNOSIS — D64.9: ICD-10-CM

## 2019-02-16 DIAGNOSIS — F33.9: ICD-10-CM

## 2019-02-16 DIAGNOSIS — J15.9: ICD-10-CM

## 2019-02-16 DIAGNOSIS — Z22.322: ICD-10-CM

## 2019-02-16 DIAGNOSIS — I25.10: ICD-10-CM

## 2019-02-16 DIAGNOSIS — E83.42: ICD-10-CM

## 2019-02-16 DIAGNOSIS — I10: ICD-10-CM

## 2019-02-16 DIAGNOSIS — J98.11: ICD-10-CM

## 2019-02-16 DIAGNOSIS — Z71.6: ICD-10-CM

## 2019-02-16 LAB
ALBUMIN SERPL-MCNC: 3 GM/DL (ref 3.1–4.5)
ALP SERPL-CCNC: 82 U/L (ref 45–117)
ALT SERPL W P-5'-P-CCNC: 61 U/L (ref 12–78)
AMPHETAMINES UR QL SCN: < 1000
APAP SERPL-MCNC: < 5 UG/ML (ref 10–30)
APPEARANCE UR: CLEAR
AST SERPL-CCNC: 78 IU/L (ref 3–35)
BARBITURATES UR QL SCN: < 200
BASOPHILS # BLD AUTO: 0.1 10*3/UL (ref 0–0.1)
BASOPHILS NFR BLD AUTO: 0.6 % (ref 0–1)
BENZODIAZ UR QL SCN: < 200
BILIRUB UR QL STRIP: NEGATIVE
BUN SERPL-MCNC: 4 MG/DL (ref 7–24)
BUN SERPL-MCNC: 7 MG/DL (ref 7–24)
BZE UR QL SCN: < 300
CANNABINOIDS UR QL SCN: < 50
CHLORIDE SERPL-SCNC: 83 MMOL/L (ref 98–107)
CHLORIDE SERPL-SCNC: 91 MMOL/L (ref 98–107)
COLOR UR: YELLOW
CREAT SERPL-MCNC: 0.48 MG/DL (ref 0.7–1.3)
CREAT SERPL-MCNC: 0.67 MG/DL (ref 0.7–1.3)
EOSINOPHIL # BLD AUTO: 0.1 10*3/UL (ref 0–0.4)
EOSINOPHIL # BLD AUTO: 1.8 % (ref 1–4)
EPI CELLS #/AREA URNS HPF: (no result) /[HPF]
ERYTHROCYTE [DISTWIDTH] IN BLOOD BY AUTOMATED COUNT: 10.6 % (ref 0–14.5)
ETHANOL SERPL-MCNC: 62 MG/DL (ref ?–3)
GLUCOSE UR QL: NEGATIVE
HCT VFR BLD AUTO: 36.3 % (ref 42–52)
HGB BLD-MCNC: 13.2 G/DL (ref 14–18)
HGB UR QL STRIP: NEGATIVE
KETONES UR QL STRIP: NEGATIVE
LEUKOCYTE ESTERASE UR QL STRIP: NEGATIVE
LYMPHOCYTES # BLD AUTO: 1.4 10*3/UL (ref 1.3–4.4)
LYMPHOCYTES NFR BLD AUTO: 17.5 % (ref 27–41)
MCH RBC QN AUTO: 33.4 PG (ref 27–31)
MCHC RBC AUTO-ENTMCNC: 36.4 G/DL (ref 33–37)
MCV RBC AUTO: 91.9 FL (ref 80–94)
METHADONE UR QL SCN: < 300
MONOCYTES # BLD AUTO: 1.1 10*3/UL (ref 0.1–1)
MONOCYTES NFR BLD MANUAL: 14.1 % (ref 3–9)
MYOGLOBIN SERPL-MCNC: 51 NG/ML (ref 16–116)
NEUT #: 4.9 10*3/UL (ref 2.3–7.9)
NEUT %: 63.9 % (ref 47–73)
NITRITE UR QL STRIP: NEGATIVE
NRBC BLD QL AUTO: 0 10*3/UL (ref 0–0)
OPIATES UR QL SCN: < 300
PCP UR QL SCN: <  25
PH UR STRIP: 6 [PH] (ref 5–9)
PLATELET # BLD AUTO: 280 10*3/UL (ref 130–400)
PMV BLD AUTO: 8.7 FL (ref 9.6–12.3)
POTASSIUM SERPL-SCNC: 3.2 MMOL/L (ref 3.5–5.1)
POTASSIUM SERPL-SCNC: 4 MMOL/L (ref 3.5–5.1)
PROT SERPL-MCNC: 7.3 GM/DL (ref 6.4–8.2)
RBC # BLD AUTO: 3.95 10*6/UL (ref 4.5–5.9)
RBC #/AREA URNS HPF: (no result) RBC/HPF (ref 0–2)
SODIUM SERPL-SCNC: 119 MMOL/L (ref 136–145)
SODIUM SERPL-SCNC: 126 MMOL/L (ref 136–145)
SP GR UR: <= 1.005 (ref 1–1.03)
UROBILINOGEN UR STRIP-MCNC: 0.2 E.U./DL (ref 0.2–1)
WBC #/AREA URNS HPF: (no result) WBC/HPF (ref 0–5)
WBC NRBC COR # BLD AUTO: 7.7 10*3/UL (ref 4.8–10.8)

## 2019-02-16 NOTE — PR
[place], [place]
 
                                      PROGRESS NOTE
 
        NAME: SHARYN EISENBERG                  [facility] #: [id number]  
        UNIT #: [id number]                       ROOM: 409       
        DOCTOR: EVELYN MCMAHON MD            BIRTHDATE: [birth year]
 
 
DOS: 02/21/2019
 
SUBJECTIVE:  The patient was seen at his bedside today, 02/21/2019 for followup
of his abnormal electrocardiogram.  He is a 58-year-old man who has a history of
long-term heavy alcohol use.  He presented to the hospital on this occasion for
alcohol intoxication and is being treated for withdrawal.  While in the
hospital, he became tachypneic and tachycardic and developed severe coughing. 
An electrocardiogram did show deep symmetric T-wave inversions in the mid and
lateral precordium.  He had been evaluated in 06/2018.  At which time, he did
have similar EKG changes.  A pharmacologic stress test at that time was reported
to be normal.  I reviewed the images and agreed that the study was low risk;
however, on this occasion, he does have a mild elevation in troponin without a
typical rise and fall pattern to suggest an acute event.  A pharmacologic stress
test was done on 02/20/2019 and showed an apical infarction, which was not
present during his evaluation in 06/2018.  He had no ongoing ischemia.  Overall,
left ventricular systolic function is preserved with an ejection fraction of
50%.  At this point, ongoing medical therapy seems most appropriate and cardiac
catheterization would not be indicated acutely.
 
The patient states that he is still having a hard time breathing, but he is
coughing much less than he was yesterday.
 
PHYSICAL EXAMINATION:
VITAL SIGNS:  His pulse is 85 and regular.  Blood pressure is 128/68.  He is
afebrile.  He weighs 76.3 kilograms with a body mass index of 27.2.
NECK:  Supple.  He has no jugular distention.
LUNGS:  Respirations are unlabored at rest and he is able to lay flat.  He has
decreased breath sounds bilaterally with expiratory prolongation and scattered
wheezes.  I did not hear any rales.  He had no presacral edema.
HEART:  Had a regular rhythm with an S4 gallop, but no S3 or murmur.
EXTREMITIES:  Showed no edema.
 
IMPRESSION:
1.  Chronic alcohol abuse.
2.  Alcohol withdrawal.
3.  Bilateral pneumonia.
4.  Evidence for myocardial infarction involving the left ventricular apex and
distal inferior wall, sometime between 06/2018 and 02/2019.  The patient's
infarction appears to have been completed and he does not have any obvious
ongoing ischemia.
 
PLAN:  I would aggressively treat his pneumonia.  Once that has been resolved
and he is ambulatory as well as off of all alcohol products, we can consider an
invasive workup if he continues to have any sort of breathing difficulties,
chest pain, etc.
 
For now, I would treat him empirically with aspirin, statin, beta blockers, etc.
 
I thank the hospitalist physicians for asking our advice regarding his care.
 
 
                              [place], [place]
 
                                      PROGRESS NOTE
 
        NAME: SHARYN EISENBERG                  ACCT #: [id number]  
        UNIT #: [id number]                       ROOM: 409       
        DOCTOR: EVELYN MCMAHON MD            BIRTHDATE: [birth year]
 
 
 
 
_________________________________
EVELYN MCMAHON MD
 
CM:PNTRANS
 
D: 02/21/19 1134                 
T: 02/22/19 0131
             
                                                            
EVELYN MCMAHON MD            
                                                            
02/22/19
0725
                              
interface

## 2019-02-16 NOTE — PR
[place], [place]
 
                                      PROGRESS NOTE
 
        NAME: SHARYN EISENBERG                  [facility] #: [id number]  
        UNIT #: [id number]                       ROOM: 409       
        DOCTOR: MAN OCASIO MD           BIRTHDATE: [birth year]
 
 
DOS: 02/23/2019
 
NEPHROLOGY FOLLOWUP NOTE
 
SUBJECTIVE:  The patient is seen and examined.  He was awake and alert.  He was
lying on bed on room air.  He states he is feeling a little bit better.  Denied
nausea or vomiting to me.  He is trying to eat better.
 
PHYSICAL EXAMINATION:
VITAL SIGNS:  Temperature afebrile, pulse 72, respiration rate 16, and blood
pressure 105/62.
HEENT:  Shows no JVD.  Sclerae are anicteric.
LUNGS:  Diminished breath sounds with occasional wheeze.
HEART:  S1, S2.  No rub, thrill, or gallop.
ABDOMEN:  Soft, nontender.
EXTREMITIES:  Showed no edema.
SKIN:  Showed no rash.
 
LABORATORY DATA:  Hemoglobin 12.6, white count 9.5, and platelets 358.  Sodium
131, potassium 3.6, CO2 of 27, BUN 9, creatinine 0.5, glucose 132, phosphorus
3.0, and magnesium 1.7.
 
IMPRESSION:
1.  Hyponatremia.  The etiology is not clear.  Questionable SIADH with elevated
urine osmolality and the appearance of euvolemia.  The patient's serum sodium
level is stable and somewhat improved.  Continue ongoing supportive care.  Avoid
thiazide diuretics.  Encourage oral intake, particularly increasing
osmoles/protein.  There is no need for hypertonic saline at this time.
2.  Pneumonia.  The patient is on antibiotics.  Management per the primary
service.
3.  Mild anemia.  Follow H and H.  Transfuse as needed.
 
 
 
_________________________________
MAN OCASIO MD
 
CM:PNTRANS
 
D: 02/23/19 1450                 
T: 02/24/19 0022
             
                                                            
MAN OCASIO MD           
                                                            
02/24/19
0714
                              
interface

## 2019-02-16 NOTE — NUR
WHILE STANDING AT BEDSIDE USING URINAL PT BECAME SHAKEY, UNSTEADY AND
DIAPHORETIC. .   EKG ORDERED. VSS.

## 2019-02-16 NOTE — CON
[place], [place]
 
                                 REPORT OF CONSULTATION
 
        NAME: SHARYN EISENBERG                    [id number]  
        [facility] #: [id number]                         ROOM: 409       
        DOCTOR: SUZANNE PHD CARA         BIRTHDATE: [birth year]
 
 
DOS: 02/19/2019
 
HISTORY OF PRESENT ILLNESS:  The patient is a 58-year-old male referred by the
hospitalist with concerns for altered mental status.  The patient has a long
history of alcohol abuse.  He reports drinking about 24 beers per day.  He is
presently on the 4th floor at [facility].  He is 
from his wife and lives alone.  He has 3 children.  He works with [employer], although states that he plans to retire soon due to physical
issues.  In addition to his alcohol use, he smokes a pack a day of cigarettes
and denies illegal drug use.
 
PAST MEDICAL HISTORY:  Alcohol dependence, anemia, anxiety, compression fracture
of the fourth lumbar vertebra, depression, elevated AST, GERD, hypertension,
hyponatremia, hypothyroid, mixed hyperlipidemia, tachycardia, vitamin D
deficiency.
 
MEDICATIONS:  Solu-Medrol, DuoNeb, Nicoderm, Cepacol, Cymbalta, aspirin,
Synthroid, Prilosec, vitamin D, Zestril, Lovenox, Protonix, Neurontin, Keppra,
Vistaril, Dulcolax, Zofran, Tylenol, Ativan, Bactroban, potassium phosphate,
K-Dur.
 
The patient was awake, alert and oriented to person and place.  He gave the date
is 04/2016.  He knew that [facility] is the current President and [facility] was the past
President and could not provide any current events.  He endorsed depression,
which he relates to not being able to work.  Affect was blunted.  He firmly
denied suicidal and homicidal ideation, plan, and intent.  He states that he
does not want to die and wants to live for his kids and grandkids.  Speech and
language were within normal limits.  The patient appeared somewhat confused at
times.  There was no evidence of hallucinations or delusions.  He did not appear
to be paranoid and his behavior was stable and he was aware that he had a bed
alarm that needed to be turned off before he went to the bathroom.  He states
that he denied any further mental health needs at this time aside from his
current medication regimen.  He does not want to pursue any type of inpatient or
outpatient alcohol treatment and does not want to pursue any further mental
health treatment at this time as well.
 
DIAGNOSES:  Alcohol use disorder, unspecified depressive disorder, unspecified
anxiety disorder.
 
RECOMMENDATIONS:  I recommended that the patient follow up with counseling and
alcohol treatment, he however, declined this recommendation.  He does not appear
to be an appropriate candidate for the [facility] at this
time.
 
Thank you very much for this consult.
 
 
 
 
                              [place], [place]
 
                                 REPORT OF CONSULTATION
 
        NAME: SHARYN EISENBERG                    ACCT #: [id number]  
        UNIT #: [id number]                         ROOM: 409       
        DOCTOR: SUZANNE, PHD CARA         BIRTHDATE: [birth year]
 
 
_________________________________
Helen Quintanilla, PhD
 
CM:CONSTR:REPORT OF CONSULTATION
 
D: 02/19/19 0912   T: 02/19/19 02/20/19     0746                                          interface

## 2019-02-16 NOTE — PR
[place], [place]
 
                                      PROGRESS NOTE
 
        NAME: SHARYN EISENBERG                  [facility] #: [id number]  
        UNIT #: [id number]                       ROOM: 409       
        DOCTOR: EVELYN MCMAHON MD            BIRTHDATE: [birth year]
 
 
DOS: 02/22/2019
 
CARDIOLOGY PROGRESS NOTE
 
SUBJECTIVE:  The patient was seen at his bedside today 02/22/2019 for followup
of atherosclerotic heart disease.  He is a 58-year-old man who has had atypical
chest pain and abnormal electrocardiograms in the past.  Evaluation last summer
included a stress test, which was normal.  He presented to the hospital on this
occasion for alcohol withdrawal and did have a mild elevation in his troponin
without atypical rise and fall pattern.  Once again, his electrocardiogram did
appear to be abnormal and therefore cardiac stress testing was done, it did show
evidence for an apical and distal inferior infarction.  Overall, left
ventricular systolic function was only mildly impaired with an ejection fraction
of 50%.  He had no evidence for ongoing ischemia.  His chest x-ray, however,
does show severe pneumonia involving most of his right lung and part of the
left.  He has been treated with antibiotics, and he states that his breathing is
easing up.  He no longer coughs incessantly.
 
PHYSICAL EXAMINATION:
VITAL SIGNS:  Today, pulse is 79 and regular, blood pressure 118/94.  He is
afebrile.
NECK:  His neck is supple.  He has no jugular distention.  Carotids are full.
LUNGS:  Respirations are unlabored.  Chest is clear.
HEART:  Has a regular rhythm.  He has a fourth heart sound, but no third heart
sound.
ABDOMEN:  Benign.
EXTREMITIES:  Showed no edema.
 
LABORATORY DATA:  Hemoglobin is 12.4, white count 12,500.  Sodium 129, potassium
3.6, BUN 9, creatinine 0.46.  Renal functions are normal.
 
IMPRESSION AND PLAN:
1.  Chronic alcohol abuse.
2.  Alcohol withdrawal.
3.  Bilateral pneumonia.
4.  Evidence for myocardial infarction involving the left ventricular apex and
distal inferior wall sometime between 06/2018 and 02/2019.  The patient's
infarction appears to be completed, and he does not have any ongoing obvious
ischemia.
 
His pneumonia is being treated by his pulmonologist and primary physicians. 
Once that has been resolved and he is ambulatory as well as clean and sober, we
can consider an invasive workup if he continues to have any sort of breathing
difficulties, chest pain, etc.  For now, I would continue to treat him
empirically with aspirin, statin, beta blocker, etc.
 
I thank the hospitalist physicians for asking our advice regarding his care.
 
 
 
 
                              [place], [place]
 
                                      PROGRESS NOTE
 
        NAME: SHARYN EISENBERG                  ACCT #: [id number]  
        UNIT #: [id number]                       ROOM: 409       
        DOCTOR: EVELYN MCMAHON MD            BIRTHDATE: [birth year]
 
 
_________________________________
EVELYN MCMAHON MD
 
CM:PNTRANS
 
D: 02/22/19 1611                 
T: 02/23/19 0004
             
                                                            
EVELYN MCMAHON MD            
                                                            
02/23/19
0004
                              
interface

## 2019-02-16 NOTE — PR
[place], [place]
 
                                      PROGRESS NOTE
 
        NAME: SHARYN EISENBERG                  [id number]  
        UNIT #: [id number]                       ROOM: 409       
        DOCTOR: BARBY SOTO MD,JANINE           BIRTHDATE: [birth year]
 
 
DOS: 02/24/2019
 
SUBJECTIVE:  The patient is noted comfortable without any acute distress. 
Denies symptoms of fever or chills.  Coughing has been subsiding gradually. 
There are no symptoms of chest pain.  Shortness of breath is improving.
 
OBJECTIVE:
VITAL SIGNS:  Normal temperature, respiratory rate 20, heart rate of 73, blood
pressure 101/52.
HEENT:  Examination shows head IS atraumatic.  Eyes nonicterus.
NECK:  Supple.
CARDIOVASCULAR:  S1, S2 are audible.
LUNGS:  Without any wheeze or crackles.
ABDOMEN:  Soft, nontender.  Bowel sounds present.
EXTREMITIES:  No acute change.
 
IMPRESSION:  Resolving acute pneumonia gradually and progressively in the
patient with improving respiratory status as well.
 
PLAN OF MANAGEMENT:  No changes in the plan of care at this time.  Continuation
of current therapy as the patient has been in progress.   Usual care. 
Supportive plan of management.
 
 
 
_________________________________
JANINE DIOR MD
 
CM:PNTRANS
 
D: 02/24/19 1517                 
T: 02/25/19 0316
             
                                                            
JANINE SOTO MD           
                                                            
02/25/19
0316
                              
interface

## 2019-02-16 NOTE — NUR
A 58, admitted to , under the
services of LINDSEY Currie DO with a diagnosis of HYPONATERMIA.
Chief complaint is ALTERED MENTAL STATUS.
Patient arrived via stretcher from ER.
Monitor applied. Initial assessment completed.
Vital signs taken and recorded.
LINDSEY CURRIE DO notified of admission to the unit.
Orders received.
See assessment for past medical history, medications
and allergies.
Patient and/or family oriented to unit. [address]
visitation policy reviewed.
Clothing/patient valuable form completed.
 
TAMI DEUTSCH

## 2019-02-16 NOTE — NUR
PT COMPLAINS OF UPSET STOMACH AND STOMACH PAIN. PT IS VERY ANXIOUS AT THIS
TIME. PRN TYLENOL, ATIVAN AND ZOFRAN GIVEN AT THIS TIME. WILL MONITOR FOR
EFFECTIVENESS.

## 2019-02-16 NOTE — PR
[place], [place]
 
                                      PROGRESS NOTE
 
        NAME: SHARYN EISENBERG                  ACCT #: [id number]  
        UNIT #: [id number]                       ROOM: 409       
        DOCTOR: JANINE GOLDBERG MD           BIRTHDATE: [birth year]
 
 
DOS: 02/22/2019
 
PULMONARY PROGRESS NOTE
 
SUBJECTIVE:  He has been noted comfortable at this time, expectorating purulent
secretion this morning.  Denies symptoms of fever or chills.  Denies symptoms of
hemoptysis or any chest pain.
 
OBJECTIVE:
VITAL SIGNS:  Which have been recorded showed normal temperature, respirations
16, heart rate 79, blood pressure 118/94.  The pulse oxygen saturation was
recorded as 96% saturation at rest on room air.
HEENT:  Shows head was atraumatic, eyes nonicterus.
NECK:  Supple.
CARDIOVASCULAR:  S1 and S2 is audible.
LUNGS:  The patient was noted without any wheeze or crackles at the present
time.  Breaths are noted mild-to-moderately diminished bilaterally.
ABDOMEN:  Soft, nontender.  Bowel sounds are present.
EXTREMITIES:  No acute change.
 
LABORATORY DATA:  CBC:  WBC count 12.5, hemoglobin 12.4.  Platelet count was
normal.  BMP that was done was noted with BUN normal, creatinine was normal,
sodium 129.
 
IMPRESSION:
1.  The patient with interstitial ground glass opacity noted in the lungs
bilaterally.
2.  Suspicion of acute pneumonia, which was noted interstitial.  Viral or
bacterial in origin remains in consideration.
3.  History of chronic alcohol dependence.
4.  Recurrent hyponatremia as well.
 
PLAN OF MANAGEMENT:  The patient will be continued on current plan of treatment
at this time as ongoing.  If the finding remains persistent for this patient,
consider bronchoscopy for further diagnosis and assessment.
 
 
 
 
                              [place], [place]
 
                                      PROGRESS NOTE
 
        NAME: SHRAYN EISENBERG                  ACCT #: [id number]  
        UNIT #: [id number]                       ROOM: 409       
        DOCTOR: JANINE GOLDBERG MD           BIRTHDATE: [birth year]
 
 
_________________________________
JANINE DIOR MD
 
CM:PNTRANS
 
D: 02/22/19 1240                 
T: 02/23/19 0113
             
                                                            
JANINE SOTO MD           
                                                            
02/23/19
0113
                              
interface

## 2019-02-16 NOTE — PR
[place], [place]
 
                                      PROGRESS NOTE
 
        NAME: SHARYN EISENBERG                  ACCT #: [id number]  
        UNIT #: [id number]                       ROOM: 409       
        DOCTOR: JANINE GOLDBERG MD           BIRTHDATE: [birth year]
 
 
DOS: 02/25/2019
 
PULMONARY PROGRESS NOTE
 
SUBJECTIVE:  The patient was noted comfortable at this time, resting in the bed
without any acute distress this morning was doing well.  Denies symptoms of
fever or chills.  Denies symptoms of hemoptysis, nausea, vomiting, diarrhea,
abdominal pain, hematemesis or melena.  The patient denies symptoms of nausea,
vomiting or diarrhea.
 
OBJECTIVE:
VITAL SIGNS:  For the patient, which has been recorded shows a normal
temperature, respiratory rate 20, heart rate 72, blood pressure 137//66. 
Pulse oxygen saturation on room air was noted 97% saturation.
HEENT:  Examination shows head was atraumatic.  Eyes:  No icterus.
NECK:  Supple.
CARDIOVASCULAR:  S1, S2 audible.
LUNGS:  The patient was noted without any wheeze or crackles at the present
time.
ABDOMEN:  Soft, nontender.  Bowel sounds present.
EXTREMITIES:  No acute edema.
 
LABORATORY DATA:  IgE level was noted elevated as 1298.  Urine for legionella
antigens were negative.
 
IMPRESSION:  Acute interstitial pneumonitis possibility of acute interstitial
pneumonia for this patient secondary to acute eosinophilic pneumonia.  Other
etiologies remains in consideration normal IgE level could be seen with allergic
bronchopulmonary aspergillosis as well.
 
PLAN OF MANAGEMENT:  The patient could be discharged home on oral prednisone and
the antibiotic outpatient followup is suggested post-discharge as well.  Obtain
another chest x-ray today as well.  Other additional treatment changes
recommended for further assessment as outpatient and abstinence of tobacco use
was recommended.
 
 
 
 
                              [place], [place]
 
                                      PROGRESS NOTE
 
        NAME: SHARYN EISENBERG                  ACCT #: [id number]  
        UNIT #: [id number]                       ROOM: 409       
        DOCTOR: JANINE GOLDBERG MD           BIRTHDATE: [birth year]
 
 
_________________________________
JANINE DIOR MD
 
CM:PNTRANS
 
D: 02/25/19 1115                 
T: 02/25/19 2258
             
                                                            
JANINE SOTO MD           
                                                            
02/25/19
2258
                              
interface

## 2019-02-16 NOTE — PR
[place], [place]
 
                                      PROGRESS NOTE
 
        NAME: SHARYN EISENBERG                  [facility] #: [id number]  
        UNIT #: [id number]                       ROOM: 409       
        DOCTOR: MAN OCASIO MD           BIRTHDATE: [birth year]
 
 
DOS: 02/24/2019
 
NEPHROLOGY FOLLOWUP NOTE
 
SUBJECTIVE:  The patient was seen and examined.  He is awake and alert.  He was
on room air.  Denied any major complaints.  Denied nausea or vomiting.  He
thinks he is feeling a little bit better.
 
PHYSICAL EXAMINATION:
VITAL SIGNS:  Temperature 98.7, pulse 73, respiratory 20, blood pressure 101/54.
HEENT:  Shows no JVD.
LUNGS:  Diminished breath sounds with no wheeze.
HEART:  S1, S2.  No rub.
ABDOMEN:  Soft, nontender.
EXTREMITIES:  Had no edema.
 
LABORATORY DATA:  Sodium 134, potassium 3.2, CO2 of 26, BUN 10, creatinine 0.65,
glucose 126, calcium 8.6.
 
ASSESSMENT AND PLAN:
1.  Hyponatremia, which seems to be improving.  Etiology is not clear.  It seems
most likely an element of SIADH in view of his elevated urine osmolality in the
setting of euvolemia.  Continue supportive care.  Avoid thiazide diuretics. 
Encourage increasing oral intake, particularly protein.
2.  Hypokalemia.  Potassium has been replaced.  Check magnesium level.
3.  Pneumonia.  Continue antibiotics per the primary service.
4.  Mild anemia.  Transfuse as needed.  Follow H and H.
 
 
 
_________________________________
MAN OCASIO MD
 
CM:PNTRANS
 
D: 02/24/19 1249                 
T: 02/24/19 2337
             
                                                            
MAN OCASIO MD           
                                                            
02/25/19
0446
                              
interface

## 2019-02-16 NOTE — CON
[place], [place]
 
                                 REPORT OF CONSULTATION
 
        NAME: SHARYN IESENBERG                    [facility] #: [id number]  
        UNIT #: [id number]                         ROOM: 409       
        DOCTOR: JANINE GOLDBERG MD             BIRTHDATE: [birth year]
 
 
DOS: 2019
 
REASON FOR CONSULTATION:  For assessment of current acute pneumonia and
respiratory distress.
 
HISTORY OF PRESENT ILLNESS:  This is a 58-year-old white male who has been noted
with history of chronic alcohol dependence.  The patient has been treated in the
hospital and managed for alcohol withdrawal.  On the day of 2019, the
patient developed acute respiratory distress, diaphoresis and tachycardia.  He
has been assessed yesterday, the patient underwent CT of the chest which does
not show any evidence of pulmonary embolism; however, acute pulmonary
infiltration noted in the lungs bilaterally with suspicion of acute pneumonia. 
The patient has been treated with antibiotic.  This morning the patient was
seen, he was noted a poor historian, but complaining of some chest congestion
and coughing and shortness of breath.  Denies symptoms of chest pain, hemoptysis
at the present time.
 
REVIEW OF SYSTEMS:  The patient is very limited; however, the patient does
complain of some fatigue and tiredness.  Denies any symptoms of fever or chills.
EYES:  Denies burning, redness, or tenderness.
EARS, NOSE, AND THROAT SYMPTOMS:  Denies sore throat, hoarseness, otalgia,
postnasal drainage or epistaxis.
CARDIOVASCULAR:  Denies angina pain, edema, pain of the lower extremities.
GASTROINTESTINAL:  No dysphagia, nausea, vomiting, diarrhea, abdominal pain,
hematemesis, melena, or hematochezia.
SKIN:  Denies abnormal lesions or rashes.
MUSCULOSKELETAL:  No acute joint pain, redness, or tenderness.
CENTRAL NERVOUS SYSTEM:  General weakness reported.  Denies any focal neurologic
deficit.
 
Remaining systems were reviewed.  They were noted all negative.
 
PAST MEDICAL HISTORY:
1.  The patient was known with history of an anxiety disorder.
2.  Chronic alcohol dependence with the previous detoxification for the patient.
3.  Hypothyroidism.
4.  Essential hypertension.
5.  Gastroesophageal reflux.
6.  Nicotine dependence.
7.  Vitamin D deficiency.
 
PAST SURGICAL HISTORY:
1.  Surgical manipulation of the ankle, right side after fracture.
2.  Therapeutic bronchoscopy that was done in 2018.
 
SOCIAL HISTORY:  The patient lives at home, has been known with a history of use
of cocaine, marijuana and tobacco use of 1 to 2 packs of cigarettes per day,
active use.
 
FAMILY HISTORY:  About father was unknown.  Mother  at the age of 61 years
 
                              [place], [place]
 
                                 REPORT OF CONSULTATION
 
        NAME: SHARYN EISENBERG                    ACCT #: [id number]  
        UNIT #: [id number]                         ROOM: 409       
        DOCTOR: BARBY SOTO MD,JANINE             BIRTHDATE: [birth year]
 
 
old with complication of heart disease.
 
CURRENT MEDICATIONS:  Administered for the patient were noted as use of Lovenox
for DVT prophylaxis, Lipitor, metoprolol tartrate, levalbuterol, Solu-Medrol 40
mg b.i.d., nicotine replacement patches, Cymbalta, aspirin, levothyroxine,
omeprazole, vitamin D, lisinopril, Keppra, vancomycin, Levaquin and Zosyn.
 
DRUG ALLERGIES:  Noted as no known drug allergies.
 
PHYSICAL EXAMINATION:
GENERAL:  This is a 58-year-old white male, currently lying in the bed, using
oxygen supplementation via nasal cannula comfortably in his bed.  Height of 5
feet 6 inches, weight 168 pounds.  BMI 27.
VITAL SIGNS:  Normal temperature of the patient last 24-four respiratory 20-16,
heart rate of 114-83, blood pressure 120//90.  Pulse oxygen saturation
recorded at rest on room air is 96% saturation. previously 2 liter nasal 94%
saturation of oxygen.
HEENT:  Mild to moderate obese.  Head was atraumatic.  Decreased posterior
pharyngeal space, high tongue base crowding soft tissue structures.
CARDIOVASCULAR:  S1, S2 is audible.
LUNGS:  The patient was noted without any wheezing.  Scattered crackles Noted at
the present time.
ABDOMEN:  Soft, nontender.  Bowel sounds present.
EXTREMITIES:  No acute edema.
MUSCULOSKELETAL:  Without any acute deformities.  Cranial nerves 2-12 intact.
 
LABORATORY DATA:  CBC that was done on 2019, hemoglobin 13.2, WBC count
and platelet count normal.  CMP done on 2019, normal BUN and creatinine. 
Sodium 119 at that time.  Potassium 3.2, chloride of 83.  ETOH level noted as
62.  The vancomycin level today was noted 12.2.  CBC that was done this morning,
WBC count 17.7, hematocrit 13.4, hematocrit 39.9, .  The platelet count
335,000 with 77% neutrophils, 11% lymphocytes.  CMP of this morning, BUN normal,
creatinine was normal, sodium 132, mildly low.  Albumin, 2.6.  Remaining LFTs
were normal.  Review of the chest x-ray that was done on 2019 shows patchy
area of infiltration noted in the left infrahilar area as well as in the right
lower lobe.  The chest x-ray that was done previously prior to yesterday was
seen with small area of atelectasis noted on 2019 in right lower lobe;
however, left side infiltration was not seen.  CT of the chest yesterday shows
evidence of ground glass opacity, which was present in different parts of the
lungs, sparing some of the lung as well.  Some consolidative changes of was also
noted in both lower lungs right and the left lower lobe with ground glass
opacity is the predominant problem.  The infiltrates were also appeared to be a
significant perihilar in origin with some previous infiltration noted in the
left apex.  CBC that was done yesterday showed WBC count elevated 32.7 at that
time.
 
IMPRESSION:
1.  The patient had been noted with respiratory distress, bilateral pulmonary
infiltration with ground glass opacities.  The patient was noted with a
differential of acute interstitial pneumonitis related to acute bacterial
 
                              [place], [place]
 
                                 REPORT OF CONSULTATION
 
        NAME: SHARYN EISENBERG                    ACCT #: [id number]  
        UNIT #: [id number]                         ROOM: [facility]       
        [facility]             BIRTHDATE: [birth year]
 
 
infection.  Aspiration cannot be completely excluded.  Other differential
noninfection to be considered as atypical of cardiogenic or noncardiogenic
pulmonary edema picture.  The other etiology would be considered for the patient
with current finding for the patient, such as hypersensitivity pneumonitis with
the patient in the differential diagnosis.  Allergic drug reaction certainly
cannot be excluded as well.
2.  The patient with history of seizure as well as alcohol withdrawal as well.
 
PLAN OF MANAGEMENT:  At this time, patient getting very broad spectrum
intravenous antibiotics resulting in reduction of the steroids.  Ordered the
urine for legionella antigen, the nasal washing for influenza A and B, nasal
washing also done.  Continue the steroids patient mostly for the management of
noninfectious etiology or the LDH for the patient as well.  Fibrobronchoscopy
could be done for the patient if necessary for further assessment.  Other
supportive therapy, plan of management.  Titrate oxygen supplementation to
maintain pulse ox saturation 92% or greater.  Other additional treatment changes
will be recommended based on progression of the illness.  Monitoring all the
other culture results as ordered including sputum for Gram stain culture.  The
viral etiology will be assessed with a limited vital profile, nasopharyngeal
swab.  Avoid any excessive fluid supplementation as well.  The patient has been
already assessed by Cardiology Services.  There were no cardiac problem noted
including a normal stress test with the patient, which performed by the
Cardiology Services, Dr. Carr.  Follow the recommendation for any
additional changes or assessment release the patient accordingly.
 
 
 
_________________________________
JANINE DIOR MD
 
CM:CONSTR:REPORT OF CONSULTATION
 
D: 19 1452   T: 19     0950                                          interface

## 2019-02-16 NOTE — PR
[place], [place]
 
                                      PROGRESS NOTE
 
        NAME: SHARYN EISENBERG                  [id number]  
        UNIT #: [id number]                       ROOM: 409       
        DOCTOR: BARBY SOTO MD,JANINE           BIRTHDATE: [birth year]
 
 
DOS: 02/26/2019
 
SUBJECTIVE:  The patient was noted comfortable at this time, resting in the bed
without any acute distress.  Coughing has been subsiding gradually.  Shortness
of breath has improved significantly.
 
OBJECTIVE:
VITAL SIGNS:  For the patient which has been recorded showed normal temperature,
respiratory rate 16, heart rate 68, and blood pressure 120/66.  Pulse ox
saturation on room air 97% saturation.
HEENT:  Examination shows head was atraumatic.  Eyes nonicterus.
NECK:  Supple.
CARDIOVASCULAR:  S1, S2 is audible.
LUNGS:  The patient without any wheeze or crackle this morning.
ABDOMEN:  Soft and nontender.  Bowel sounds present.
EXTREMITIES:  No acute change.
 
IMPRESSION:
1. Progressive improvement and resolution of acute respiratory symptom noted for
the acute pneumonia radiologically and clinically.
2. Hyponatremia.
3. Chronic alcohol dependence as well.
4. Eosinophilia also noted.  The possibility of elevated eosinophil count
secondary to interstitial lung disease including allergic bronchopulmonary
aspergillosis cannot be excluded.
 
PLAN OF MANAGEMENT:  Tapering dose of prednisone.  The patient will be strongly
advised to be assessed in the office for further assessment of his current
pulmonary disease.  Other therapy, plan of management.  Abstinence tobacco use
was encouraged.
 
 
 
_________________________________
JANINE DIOR MD
 
CM:PNTRANS
 
D: 02/26/19 1129                 
T: 02/26/19 1459
             
                                                            
JANINE SOTO MD           
                                                            
02/26/19
1459
                              
interface

## 2019-02-17 VITALS — SYSTOLIC BLOOD PRESSURE: 134 MMHG | DIASTOLIC BLOOD PRESSURE: 74 MMHG

## 2019-02-17 VITALS — SYSTOLIC BLOOD PRESSURE: 138 MMHG | DIASTOLIC BLOOD PRESSURE: 76 MMHG

## 2019-02-17 VITALS — DIASTOLIC BLOOD PRESSURE: 85 MMHG

## 2019-02-17 VITALS — DIASTOLIC BLOOD PRESSURE: 67 MMHG

## 2019-02-17 LAB
25(OH)D3 SERPL-MCNC: 46.8 NG/ML (ref 30–100)
ALBUMIN SERPL-MCNC: 2.9 GM/DL (ref 3.1–4.5)
ALP SERPL-CCNC: 78 U/L (ref 45–117)
ALT SERPL W P-5'-P-CCNC: 54 U/L (ref 12–78)
AST SERPL-CCNC: 52 IU/L (ref 3–35)
BUN SERPL-MCNC: 9 MG/DL (ref 7–24)
CHLORIDE SERPL-SCNC: 98 MMOL/L (ref 98–107)
CHOLEST SERPL-MCNC: 94 MG/DL (ref ?–200)
CREAT SERPL-MCNC: 0.5 MG/DL (ref 0.7–1.3)
ERYTHROCYTE [DISTWIDTH] IN BLOOD BY AUTOMATED COUNT: 11.2 % (ref 0–14.5)
HCT VFR BLD AUTO: 36.8 % (ref 42–52)
HDLC SERPL-MCNC: 50 MG/DL (ref 40–60)
HGB BLD-MCNC: 13.1 G/DL (ref 14–18)
LDLC SERPL DIRECT ASSAY-MCNC: 35 MG/DL (ref 9–159)
MCH RBC QN AUTO: 34.5 PG (ref 27–31)
MCHC RBC AUTO-ENTMCNC: 35.6 G/DL (ref 33–37)
MCV RBC AUTO: 96.8 FL (ref 80–94)
NRBC BLD QL AUTO: 0 10*3/UL (ref 0–0)
PHOSPHATE SERPL-MCNC: 2.6 MG/DL (ref 2.5–4.9)
PLATELET # BLD AUTO: 292 10*3/UL (ref 130–400)
PLATELET SUFFICIENCY: NORMAL
PMV BLD AUTO: 9 FL (ref 9.6–12.3)
POTASSIUM SERPL-SCNC: 3.2 MMOL/L (ref 3.5–5.1)
PROT SERPL-MCNC: 6.8 GM/DL (ref 6.4–8.2)
RBC # BLD AUTO: 3.8 10*6/UL (ref 4.5–5.9)
RBC MORPH BLD: NORMAL
SODIUM SERPL-SCNC: 132 MMOL/L (ref 136–145)
T4 FREE SERPL-MCNC: 1.04 NG/DL (ref 0.76–1.46)
TOTAL CELLS COUNTED: 100 #CELLS
TRIGL SERPL-MCNC: 43 MG/DL (ref ?–150)
TSH SERPL DL<=0.005 MIU/L-ACNC: 0.72 UIU/ML (ref 0.36–4.75)
VITAMIN B12: 494 PG/ML (ref 193–986)
VLDLC SERPL CALC-MCNC: 9 MG/DL (ref 6–40)
WBC NRBC COR # BLD AUTO: 5.3 10*3/UL (ref 4.8–10.8)

## 2019-02-17 NOTE — NUR
AT APPROXIMATELY 0135 PATIENTS BED ALARM SOUNDED WENT INTO ROOM TO FIND
PATIENT BESIDE BED ON ALL FOURS, INCONTINENT OF BOWEL. PATIENT ASSESSED,
CLEANED UP AND ASSISTED BACK TO BED AT THIS TIME. SMALL SKIN TEAR NOTED TO
RIGHT ELBOW. NURSING SUPERVISOR AND DR. BENTLEY AWARE AT 0140. BED IS LOW,
LOCKED, ALARMED, AND CALL LIGHT IS WITHIN REACH.

## 2019-02-17 NOTE — NUR
NOTIFIED OF PATIENT'S C/O HEADACHE AND SORE THROAT. DISCUSSED PO
CEPACHOL AND PO TYLENOL ADMINISTERED AROUND 1810 AND ARE NOT DUE TO BE GIVEN.
NEW ORDER RECEIVED FOR 1 TIME DOSE PO MOTRIN 600 MG AND HALLS COUGH DROPS Q1H
PRN FOR SORE THROAT.

## 2019-02-17 NOTE — NUR
1X PRN IM GEODON GIVEN AT THIS TIME FOR AGITATION. PT TOLERATED WELL. WILL
MONITOR PT RESPONSE. BED IN LOWEST, LOCKED POS, CALL LIGHT WITHIN REACH, BED
ALARM MAINTAINED.

## 2019-02-18 VITALS — DIASTOLIC BLOOD PRESSURE: 74 MMHG

## 2019-02-18 VITALS — DIASTOLIC BLOOD PRESSURE: 84 MMHG | SYSTOLIC BLOOD PRESSURE: 154 MMHG

## 2019-02-18 VITALS — DIASTOLIC BLOOD PRESSURE: 66 MMHG

## 2019-02-18 VITALS — DIASTOLIC BLOOD PRESSURE: 83 MMHG

## 2019-02-18 LAB
BASOPHILS # BLD AUTO: 0.1 10*3/UL (ref 0–0.1)
BASOPHILS NFR BLD AUTO: 1.5 % (ref 0–1)
BUN SERPL-MCNC: 9 MG/DL (ref 7–24)
CHLORIDE SERPL-SCNC: 98 MMOL/L (ref 98–107)
CREAT SERPL-MCNC: 0.51 MG/DL (ref 0.7–1.3)
EOSINOPHIL # BLD AUTO: 0.1 10*3/UL (ref 0–0.4)
EOSINOPHIL # BLD AUTO: 1.8 % (ref 1–4)
ERYTHROCYTE [DISTWIDTH] IN BLOOD BY AUTOMATED COUNT: 11.3 % (ref 0–14.5)
HCT VFR BLD AUTO: 35.9 % (ref 42–52)
HGB BLD-MCNC: 12.3 G/DL (ref 14–18)
LYMPHOCYTES # BLD AUTO: 1.8 10*3/UL (ref 1.3–4.4)
LYMPHOCYTES NFR BLD AUTO: 26.9 % (ref 27–41)
MCH RBC QN AUTO: 33.9 PG (ref 27–31)
MCHC RBC AUTO-ENTMCNC: 34.3 G/DL (ref 33–37)
MCV RBC AUTO: 98.9 FL (ref 80–94)
MONOCYTES # BLD AUTO: 1.3 10*3/UL (ref 0.1–1)
MONOCYTES NFR BLD MANUAL: 19.5 % (ref 3–9)
NEUT #: 3.3 10*3/UL (ref 2.3–7.9)
NEUT %: 48.2 % (ref 47–73)
NRBC BLD QL AUTO: 0 10*3/UL (ref 0–0)
PLATELET # BLD AUTO: 271 10*3/UL (ref 130–400)
PMV BLD AUTO: 9 FL (ref 9.6–12.3)
POTASSIUM SERPL-SCNC: 3.7 MMOL/L (ref 3.5–5.1)
RBC # BLD AUTO: 3.63 10*6/UL (ref 4.5–5.9)
SODIUM SERPL-SCNC: 129 MMOL/L (ref 136–145)
WBC NRBC COR # BLD AUTO: 6.7 10*3/UL (ref 4.8–10.8)

## 2019-02-18 NOTE — NUR
case management attempted to visit with patient, nursing staff was working
with patient, will see later today

## 2019-02-18 NOTE — NUR
Nutritional Support Services Note: Pt is eating % of all meals served.
Regular diet as ordered with Ensure po TID. Staff to encourage po intake of
all meals and fluids. No other nutrition recommendations needed at this time.
Will follow as needed.                        Sade Santana Rdn Ld

## 2019-02-18 NOTE — NUR
PT PLEASANT AND COOPERATIVE. VISITING WITH FAMILY. CALL LIGHT WITHIN REACH.
REPORT GIVEN TO [place].
[facility] JON SMITH

## 2019-02-18 NOTE — NUR
case management visits with patient, patient states he lives at home, gets
around fine, attempted to discuss with him a discharge plan and patient didn't
want to talk anymore. will see at a later time

## 2019-02-18 NOTE — NUR
SHARYN EISENBERG [id number]
 
Please refer to the physician's history and physical for past medical history,
comorbid conditions, and allergies.
   Diagnosis: HYPONATREMIA
 
   Art Score: 21,LOW OR NO RISK
 
WOUND DESCRIPTIONS:
This nurse was asked to evaluate patient's right elbow. Upon assessment no
open areas noted at this time. No drainage noted at this time. Patient does
have several ecchymotic areas noted.
   Surface the patient is resting on: Isoflex
 
SKIN PREVENTION RECOMMENDATION:
   1.  Pressure redistribution support surface as appropriate
   2.  Elevate heels
   3.  Remove boots/TEDS every shift and reapply
   4.  Head of bed 30 degrees as tolerated
   5.  Assess nutrition and hydration
   6.  Manage moisture
   7.  Avoid the use of containment devices while in bed
   8.  Use absorptive products on surfaces limit layers of linens on bed
   9.  Turn and reposition every 1-2 hours in bed and every 1 hour in chair as
       tolerated
   10. Weight shifts every 15 minutes while up in chair
   11. Offloading with pillows or device to keep heels elevated off bed
   12. Monitor skin at least every shift
   13. Inspect under medical devices twice a day

## 2019-02-18 NOTE — NUR
PT C/O OF A HEADACHE AND A SORE THROAT. CEPACOL AND TYLENOL 650 MG GIVEN PER
PATIENT REQUEST.
JIGNA LYNN.RCC

## 2019-02-18 NOTE — NUR
PATIENT MEDICATED WITH CEPACHOL LOZENGE FOR C/O SORE/DRY THROAT. DENIES ANY
OTHER PAIN/GENERALIZED DISCOMFORT AT THIS TIME. WILL MONITOR. CALL LIGHT IN
REACH.

## 2019-02-18 NOTE — NUR
PT LYING IN BED. USED THE BATHROOM TWICE WHILE COMPLETING MORNING ASSESSMENT
C/O DIARRHEA. PT C/O FEELING LIGHTHEADED AND WEAK TODAY. BREAKFAST WAS
ORDERED. ENCOURGAED PT TO USE CALL LIGHT WHEN NEEDING TO USE THE BATHROOM.
WILL CONTINUE TO MONITOR.
JIGNA LYNN.[facility]

## 2019-02-18 NOTE — NUR
Nursing screen received and chart review completed. Patient admitted with
altered mental status , delusional behavior and intoxication with metabolic
encephalopathy. If patient should have a decline in ADLs, safety in mobility
or require an eval for discharge planning, consider OT. Thank you.
Amina Weiner OTR/l

## 2019-02-18 NOTE — NUR
case management visits with patient, patient very drowsy at this time, not
able to carry on a converstations. will see patient tomorrow and see if family
members are present to talk with

## 2019-02-19 VITALS — DIASTOLIC BLOOD PRESSURE: 91 MMHG

## 2019-02-19 VITALS — DIASTOLIC BLOOD PRESSURE: 90 MMHG | SYSTOLIC BLOOD PRESSURE: 136 MMHG

## 2019-02-19 VITALS — DIASTOLIC BLOOD PRESSURE: 72 MMHG

## 2019-02-19 VITALS — DIASTOLIC BLOOD PRESSURE: 77 MMHG

## 2019-02-19 LAB
ALBUMIN SERPL-MCNC: 2.6 GM/DL (ref 3.1–4.5)
BUN SERPL-MCNC: 5 MG/DL (ref 7–24)
CHLORIDE SERPL-SCNC: 90 MMOL/L (ref 98–107)
CREAT SERPL-MCNC: 0.46 MG/DL (ref 0.7–1.3)
PHOSPHATE SERPL-MCNC: 2.3 MG/DL (ref 2.5–4.9)
POTASSIUM SERPL-SCNC: 3.1 MMOL/L (ref 3.5–5.1)
SODIUM SERPL-SCNC: 125 MMOL/L (ref 136–145)

## 2019-02-19 NOTE — NUR
case management visits with patient, patient states he will be going home when
able and denies any home needs. case management will follow

## 2019-02-19 NOTE — NUR
PATIENT REMAINS COUGHING AND DIAPHORETIC. O2 REMAINS IN USE. POX STABLE. HR
120S-140S PER CM.  AWARE. ATTEMPTING TO CONTACT CT FOR CTA TO BE DONE.

## 2019-02-19 NOTE — NUR
PHYSICAL THERAPY
PAtient respectfully declines all PT services this date. " I do not feel well
today. Not today." Sohan attempt at a later date. Thank you for this referral.
Maisha Hung,PT

## 2019-02-19 NOTE — NUR
PATIENT HAVING A COUGHING EPISODE AND IS VERY ANXIOUS. TREMORS NOTED. PATIENT
VERY SHAKEY. MULTIPLE MEDICATIONS ADMINISTERED AT THIS TIME TO HELP CONTROL
ANXIETY/HELP PATIENT BREATHE.
 
IV ATIVAN GIVEN FOR ANXIETY/DTs.
CEPACHOL LOZENGE GIVEN TO HELP SUPPRESS COUGH.
PO TYLENOL GIVEN FOR C/O HEADACHE.
SCHEDULED IV SOLUMEDROL GIVEN TO DECREASE INFLAMMATION AND IMPROVE BREATHING.
 
PATRICIA'S COUGH DROP GIVEN AFTER CEPACHOL WAS INEFFECTIVE FOR COUGH.
IV ZOFRAN GIVEN FOR NAUSEA/SPITTING UP. PATIENT IS VERY WORKED UP AND COUGHING
SO HARD THAT HE IS SPITTING UP.
 
 NOTIFIED OF SITUATION. PATIENT DIAPHORETIC, PALE, TREMORING, AND
OVERALL APPEARS IN DISTRESS WITHOUT RELIEF FROM PRN MEDICATIONS. HR 130S PER
AUTOMATIC BP CUFF. POX 97% ON ROOM AIR. O2 APPLIED AT 2L NC FOR COMFORT.
 
/ HERE TO SEE PATIENT. PO VISTARIL ADMINISTERED AT THIS TIME
FOR ANXIETY. ROBITUSSIN COUGH SYRUP ADMINISTERED FOR COUGH.
 
NEW ORDERS RECEIVED FOR STAT EKG, CONTINUOUS CARDIAC MONITORING, LABS, AND CTA
OF CHEST.

## 2019-02-19 NOTE — NUR
AWARE OF PATIENT REFUSING ANY MORE IV ATTEMPTS. PATIENT'S RAC IV BLEW
IN CT AND MULTIPLE 4 ATTEMPTS AT IV START HAVE BEEN MADE WITHOUT SUCCESS.
PATIENT REFUSING ADDITIONAL ATTEMPTS TO BE MADE. STATES CTA WILL BE CANCELLED
FOR TONIGHT.

## 2019-02-20 VITALS — DIASTOLIC BLOOD PRESSURE: 59 MMHG

## 2019-02-20 VITALS — DIASTOLIC BLOOD PRESSURE: 65 MMHG | SYSTOLIC BLOOD PRESSURE: 129 MMHG

## 2019-02-20 VITALS — DIASTOLIC BLOOD PRESSURE: 78 MMHG

## 2019-02-20 VITALS — DIASTOLIC BLOOD PRESSURE: 58 MMHG | SYSTOLIC BLOOD PRESSURE: 96 MMHG

## 2019-02-20 VITALS — DIASTOLIC BLOOD PRESSURE: 77 MMHG

## 2019-02-20 VITALS — DIASTOLIC BLOOD PRESSURE: 90 MMHG | SYSTOLIC BLOOD PRESSURE: 154 MMHG

## 2019-02-20 VITALS — DIASTOLIC BLOOD PRESSURE: 69 MMHG

## 2019-02-20 VITALS — DIASTOLIC BLOOD PRESSURE: 93 MMHG

## 2019-02-20 LAB
ALBUMIN SERPL-MCNC: 2.6 GM/DL (ref 3.1–4.5)
ALP SERPL-CCNC: 76 U/L (ref 45–117)
ALT SERPL W P-5'-P-CCNC: 46 U/L (ref 12–78)
AST SERPL-CCNC: 45 IU/L (ref 3–35)
BASE EXCESS BLDA CALC-SCNC: 1.1 MMOL/L (ref -2–2)
BUN SERPL-MCNC: 9 MG/DL (ref 7–24)
CHLORIDE SERPL-SCNC: 97 MMOL/L (ref 98–107)
CREAT SERPL-MCNC: 0.54 MG/DL (ref 0.7–1.3)
ERYTHROCYTE [DISTWIDTH] IN BLOOD BY AUTOMATED COUNT: 11.3 % (ref 0–14.5)
HCO3 BLDA-SCNC: 24.2 MMOL/L (ref 22–26)
HCT VFR BLD AUTO: 38.5 % (ref 42–52)
HGB BLD-MCNC: 13.4 G/DL (ref 14–18)
MCH RBC QN AUTO: 34.1 PG (ref 27–31)
MCHC RBC AUTO-ENTMCNC: 34.8 G/DL (ref 33–37)
MCV RBC AUTO: 98 FL (ref 80–94)
NRBC BLD QL AUTO: 0 10*3/UL (ref 0–0)
PCO2 BLDA: 35.6 MMHG (ref 35–45)
PH BLDA: 7.45 [PH] (ref 7.35–7.45)
PHOSPHATE SERPL-MCNC: 3.4 MG/DL (ref 2.5–4.9)
PLATELET # BLD AUTO: 292 10*3/UL (ref 130–400)
PLATELET SUFFICIENCY: NORMAL
PMV BLD AUTO: 8.7 FL (ref 9.6–12.3)
PO2 BLDA: 173 MMHG (ref 80–90)
POTASSIUM SERPL-SCNC: 4.4 MMOL/L (ref 3.5–5.1)
PROT SERPL-MCNC: 7.2 GM/DL (ref 6.4–8.2)
RBC # BLD AUTO: 3.93 10*6/UL (ref 4.5–5.9)
RBC MORPH BLD: NORMAL
SAO2 % BLDA: 99.5 % (ref 95–97)
SODIUM SERPL-SCNC: 131 MMOL/L (ref 136–145)
TOTAL CELLS COUNTED: 100 #CELLS
WBC NRBC COR # BLD AUTO: 32.7 10*3/UL (ref 4.8–10.8)

## 2019-02-20 PROCEDURE — 4A02XM4 MEASUREMENT OF CARDIAC TOTAL ACTIVITY, EXTERNAL APPROACH: ICD-10-PCS

## 2019-02-20 PROCEDURE — 3E073KZ INTRODUCTION OF OTHER DIAGNOSTIC SUBSTANCE INTO CORONARY ARTERY, PERCUTANEOUS APPROACH: ICD-10-PCS

## 2019-02-20 NOTE — NUR
RADIOLOGY CELL PHONE CALLED FOR CT TECH. AWAITING CALL BACK REGARDING CTA OF
CHEST DONE ON PT THIS AFTERNOON

## 2019-02-20 NOTE — NUR
NOTIFIED OF /78 MANUALLY FOLLOWING 5MG IV HYDRALAZINE. NEW
ORDER RECEIVED FOR ANOTHER 5MG IV HYDRALAZINE TO BE GIVEN.

## 2019-02-20 NOTE — NUR
PATIENT GIVEN IV ATIVAN SLOWLY PER PRN ORDER FOR INCREASED ANXIETY. WILL
MONITOR EFFECTIVENESS. PT ENCOURAGED TO TAKE SLOW DEEP BREATHS. PT C/O
CONSTANT HACKY COUGH. PRN COUGH DROP GIVEN AT THIS TIME. WILL MONITOR
EFFECTIVENESS.  ON FLOOR AND AWARE OF PATIENT'S CONDITION.

## 2019-02-20 NOTE — NUR
MIDLINE IV REMOVED AT THIS TIME. UNABLE TO FLUSH AND/OR GET BLOOD RETURN. 2
RN'S ATTEMPTED TO SAVE MIDLINE. UNABLE TO KEEP MIDLINE SITE AT THIS TIME.
PATIENT STILL HAS AN IV SITE IN LEFT FOREARM. 20G PRESENT. FLUSHES WITHOUT
DIFFICULTY. IV ATBS INFUSING. WILL CONTINUE TO MONITOR. VSS. PATIENT IS TO
REMAIN NPO.

## 2019-02-20 NOTE — NUR
PATIENTS BP RECHECKED AT THIS TIME. /90. COUGHING FIT GOING ON AT THIS
TIME.  UPDATED ON MOST RECENT BP. PT REPOSITIONED IN BED. PT SITTING
UP IN BED WITH HOB ELEVATED. WILL MONITOR.

## 2019-02-20 NOTE — NUR
PT APPEARS MUCH MORE CALMER AT THIS TIME. RESPIRATIONS LESS LABORED. 02 IN
USE. POX 97% VIA 2LNC. VSS. WILL CONTINUE TO MONITOR. BED ALARM MAINTAINED FOR
SAFETY.

## 2019-02-20 NOTE — NUR
INFORMED CONSENT OBTAINED FOR A LEXISCAN STRESS TEST WITH DR. MCMAHON.
RESTING HT RT , WITH A BP /68. BREATH SOUNDS CLEAR CARMINA, POX OF 98%
VIA RA.
RECEIVED LEXISCAN 0.4 MG OVER 10 SECONDS. NO C/O VOICED, DENIES CHEST PAIN.
HAD A PEAK HT RT , WITH A BP OF 92/50.
LAST RECOVERY HT RT  WITH A BP /60.
AWAITING NUCLEAR IMAGING IN STABLE CONDITION.

## 2019-02-20 NOTE — NUR
PATIENT CANNOT HAVE VQ SCAN & STRESS TEST IN SAME DAY.  NOTIFIED.
INSTRUCTED TO D/C VQ SCAN AND PATIENT IS STILL TO HAVE STRESS TEST TODAY.

## 2019-02-20 NOTE — NUR
SQ LOVENOX GIVEN PER ORDER. PATIENT DENIES ANY C/O CHEST PAIN AT THIS TIME.
STATES HE IS OCCASIONALLY COUGHING STILL, BUT DENIES ANY PAIN IN CHEST.
 NOTIFIED OF THIS. ALSO AWARE THAT NEXT TROPONIN WAS DRAWN & WE ARE
AWAITING RESULTS.

## 2019-02-20 NOTE — NUR
Patient not available for Occupational Therapy evaluation as he is out of the
room for a stress test.
Amina Weiner OTR/l

## 2019-02-20 NOTE — NUR
Patient not available for Occupational Therapy evaluation as he is with MD
and getting an IV.
Amina Weiner OTR/L

## 2019-02-20 NOTE — NUR
PATIENT MEDICATED WITH PO VISTARIL WITH SMALL SIPS OF WATER FOR ANXIETY.
CEPACHOL LOZENGE ALSO ADMINISTERED AT THIS TIME FOR COUGHING EPISODE. IV MAG
INFUSION INITIATED AT THIS TIME PER ORDER. WILL MONITOR. CALL LIGHT IN REACH.

## 2019-02-20 NOTE — NUR
CALLED WITH [facility]'S RECOMMENDATIONS ON LOVENOX. ORDERS
CHANGED TO REFLECT PHYSICIAN'S DESIRED DOSING. WANTS FIRST DOSE TO START NOW.

## 2019-02-20 NOTE — NUR
case management visits with patient, patient states he will be going home when
able. discussed with him vNA and he declines any services at this time

## 2019-02-21 VITALS — DIASTOLIC BLOOD PRESSURE: 77 MMHG | SYSTOLIC BLOOD PRESSURE: 120 MMHG

## 2019-02-21 VITALS — DIASTOLIC BLOOD PRESSURE: 65 MMHG

## 2019-02-21 VITALS — DIASTOLIC BLOOD PRESSURE: 68 MMHG | SYSTOLIC BLOOD PRESSURE: 119 MMHG

## 2019-02-21 VITALS — DIASTOLIC BLOOD PRESSURE: 77 MMHG | SYSTOLIC BLOOD PRESSURE: 111 MMHG

## 2019-02-21 LAB
ALBUMIN SERPL-MCNC: 2.6 GM/DL (ref 3.1–4.5)
ALP SERPL-CCNC: 73 U/L (ref 45–117)
ALT SERPL W P-5'-P-CCNC: 34 U/L (ref 12–78)
APPEARANCE UR: CLEAR
AST SERPL-CCNC: 25 IU/L (ref 3–35)
BACTERIA #/AREA URNS HPF: (no result) /[HPF]
BILIRUB UR QL STRIP: NEGATIVE
BUN SERPL-MCNC: 9 MG/DL (ref 7–24)
CASTS URNS QL MICRO: (no result)
CHLORIDE SERPL-SCNC: 99 MMOL/L (ref 98–107)
COLOR UR: YELLOW
CREAT SERPL-MCNC: 0.59 MG/DL (ref 0.7–1.3)
EPI CELLS #/AREA URNS HPF: (no result) /[HPF]
ERYTHROCYTE [DISTWIDTH] IN BLOOD BY AUTOMATED COUNT: 11.6 % (ref 0–14.5)
GLUCOSE UR QL: NEGATIVE
HCT VFR BLD AUTO: 39.9 % (ref 42–52)
HGB BLD-MCNC: 13.4 G/DL (ref 14–18)
HGB UR QL STRIP: NEGATIVE
KETONES UR QL STRIP: NEGATIVE
LEUKOCYTE ESTERASE UR QL STRIP: NEGATIVE
MACROCYTES BLD QL SMEAR: SLIGHT
MCH RBC QN AUTO: 33.8 PG (ref 27–31)
MCHC RBC AUTO-ENTMCNC: 33.6 G/DL (ref 33–37)
MCV RBC AUTO: 100.8 FL (ref 80–94)
NITRITE UR QL STRIP: NEGATIVE
NRBC BLD QL AUTO: 0 10*3/UL (ref 0–0)
PH UR STRIP: 7 [PH] (ref 5–9)
PHOSPHATE SERPL-MCNC: 3.9 MG/DL (ref 2.5–4.9)
PLATELET # BLD AUTO: 335 10*3/UL (ref 130–400)
PLATELET SUFFICIENCY: NORMAL
PMV BLD AUTO: 8.7 FL (ref 9.6–12.3)
POTASSIUM SERPL-SCNC: 4.4 MMOL/L (ref 3.5–5.1)
PROT SERPL-MCNC: 7.4 GM/DL (ref 6.4–8.2)
RBC # BLD AUTO: 3.96 10*6/UL (ref 4.5–5.9)
SODIUM SERPL-SCNC: 132 MMOL/L (ref 136–145)
SP GR UR: 1.02 (ref 1–1.03)
TOTAL CELLS COUNTED: 100 #CELLS
UROBILINOGEN UR STRIP-MCNC: 1 E.U./DL (ref 0.2–1)
WBC #/AREA URNS HPF: (no result) WBC/HPF (ref 0–5)
WBC NRBC COR # BLD AUTO: 17.7 10*3/UL (ref 4.8–10.8)

## 2019-02-21 NOTE — NUR
Occupational Therapy evaluation offered but patient declined this date
reporting that he did not feel well.
Amina Weiner OTR/l

## 2019-02-21 NOTE — NUR
PT REQUESTS ATIVAN 1 MG FOR TREMORS AND TYLNEOL FOR HEADACHE.  MEDICATION
GIVEN AT THIS TIME. WILL MONITOR FOR EFFECTIVENESS. CALL LIGHT IN REACH.

## 2019-02-21 NOTE — NUR
PT C/O COUGH. TESSALON PERLES GIVEN. WILL MONITOR FOR EFFECTIVENESS. PT ALSO
STATES THAT IV SITE HAS BEEN PULLED OUT.  NEW IV STARTED AT THIS TIME. PT
TOLERATED WELL. CALL LIGHT IN REACH.

## 2019-02-21 NOTE — NUR
SPEECH PATHOLOGY
Clinical swallowing evaluation completed as per orders. This was ordered due
to possible aspiration.  Medical history includes ETOH abuse, GERD and thyroid
disease.  Patient was NPO until completion of assessment. He was alert and
cooperative, with generalized weakness and c/o poor appetite. He sat upright
at side of bed. Oral peripheral exam revealed presence of natural teeth with
some missing. Lingual/labial and buccal skills were WNL in terms of strength,
ROM and coordination. Volitional cough was weak. Patient was assessed with
puree, various solids and thin liquid. He displayed no overt difficulty with
any item, with adequate chewing, no oral residue and no cough or wet vocal
quality. He began complaining of nausea and also displayed frequent belching.
Recommend patient receive a regular diet and thin liquids. Recommend safe
swallow precautions such as upright positioning and small bites and sips. Also
recommend education on reflux precautions. Short term follow up is recommended
to ensure safety through education and adherence to safe swallow precautions.
Results and vasile. were shared with patient, his son and nurse and they
verbalized understanding. Refer to report in meditech for further information.
Thank you for this referral.
 
PATRICE FIGUEROA MSCCC-SLP

## 2019-02-21 NOTE — NUR
PT HAS S/S OF TREMORS DUE TO ALCOHOL WITHDRAWAL.  ATIVAN 1 MG GIVEN VIA IV.
WILL MONITOR FOR EFFECTIVENESS. CALL LIGHT IN REACH.

## 2019-02-21 NOTE — NUR
PT GIVEN TYLENOL FOR HEADACHE AND ATIVAN 1 MG FOR TREMORS. WILL MONITOR. PT
LYING IN BED. IV ANTIBIOTIC INFUSING PER ORDERS. CALL LIGHT IN REACH.

## 2019-02-22 VITALS — DIASTOLIC BLOOD PRESSURE: 64 MMHG

## 2019-02-22 VITALS — DIASTOLIC BLOOD PRESSURE: 59 MMHG | SYSTOLIC BLOOD PRESSURE: 101 MMHG

## 2019-02-22 VITALS — DIASTOLIC BLOOD PRESSURE: 56 MMHG | SYSTOLIC BLOOD PRESSURE: 94 MMHG

## 2019-02-22 VITALS — DIASTOLIC BLOOD PRESSURE: 78 MMHG | SYSTOLIC BLOOD PRESSURE: 138 MMHG

## 2019-02-22 VITALS — DIASTOLIC BLOOD PRESSURE: 94 MMHG

## 2019-02-22 LAB
ALBUMIN SERPL-MCNC: 2.4 GM/DL (ref 3.1–4.5)
ALP SERPL-CCNC: 66 U/L (ref 45–117)
ALT SERPL W P-5'-P-CCNC: 30 U/L (ref 12–78)
AST SERPL-CCNC: 25 IU/L (ref 3–35)
BASOPHILS # BLD AUTO: 0 10*3/UL (ref 0–0.1)
BASOPHILS NFR BLD AUTO: 0.2 % (ref 0–1)
BUN SERPL-MCNC: 9 MG/DL (ref 7–24)
CHLORIDE SERPL-SCNC: 94 MMOL/L (ref 98–107)
CHLORIDE UR-SCNC: 69 MMOL/L
CREAT SERPL-MCNC: 0.46 MG/DL (ref 0.7–1.3)
EOSINOPHIL # BLD AUTO: 0 % (ref 1–4)
EOSINOPHIL # BLD AUTO: 0 10*3/UL (ref 0–0.4)
ERYTHROCYTE [DISTWIDTH] IN BLOOD BY AUTOMATED COUNT: 11.1 % (ref 0–14.5)
HCT VFR BLD AUTO: 36.7 % (ref 42–52)
HGB BLD-MCNC: 12.4 G/DL (ref 14–18)
LYMPHOCYTES # BLD AUTO: 1.3 10*3/UL (ref 1.3–4.4)
LYMPHOCYTES NFR BLD AUTO: 10.7 % (ref 27–41)
MCH RBC QN AUTO: 33 PG (ref 27–31)
MCHC RBC AUTO-ENTMCNC: 33.8 G/DL (ref 33–37)
MCV RBC AUTO: 97.6 FL (ref 80–94)
MONOCYTES # BLD AUTO: 1.4 10*3/UL (ref 0.1–1)
MONOCYTES NFR BLD MANUAL: 10.9 % (ref 3–9)
NEUT #: 9.8 10*3/UL (ref 2.3–7.9)
NEUT %: 77.8 % (ref 47–73)
NRBC BLD QL AUTO: 0 % (ref 0–0)
PHOSPHATE SERPL-MCNC: 3.6 MG/DL (ref 2.5–4.9)
PLATELET # BLD AUTO: 313 10*3/UL (ref 130–400)
PMV BLD AUTO: 9 FL (ref 9.6–12.3)
POTASSIUM SERPL-SCNC: 3.6 MMOL/L (ref 3.5–5.1)
PROT SERPL-MCNC: 6.7 GM/DL (ref 6.4–8.2)
RBC # BLD AUTO: 3.76 10*6/UL (ref 4.5–5.9)
SODIUM SERPL-SCNC: 129 MMOL/L (ref 136–145)
WBC NRBC COR # BLD AUTO: 12.5 10*3/UL (ref 4.8–10.8)

## 2019-02-22 NOTE — NUR
PATIENT MEDICATED WITH CEPACOL LOZENGE, HALLS COUGH DROP AND TYLENOL AT THIS
TIME FOR COUGH AND HEADACHE. WILL CONTINUE TO MONITOR. CALL LIGHT IN REACH.

## 2019-02-22 NOTE — NUR
SPEECH PATHOLOGY
 
Patient seen for swallowing treatment and education this date. Patient
reclined in bed upon clinician arrival. Meal tray was by patient's bed.
Patient reported he had eaten a few bites of his burger without difficulty.
With encouragement, patient took additional bite of burger and demonstrated no
overt difficulty with oral or pharyngeal phase of swallow. Clinician cued
patient to take a sip of a liquid. Patient took a sip of pop that was sitting
bedside and cleared oral cavity. Patient stated he was full and didn't wish to
consume any additional trials. Clinician provided education on safe swallowing
strategies which include sitting upright during all PO intake, taking small
bites and sips, alternating between solids and liquids, and remaining upright
for 30 minutes following all meals. Patient attentive and verbalized
understanding. Clinician also provided education regarding GERD precautions
and strategies. Clinician recommended limited consumption of carbonated
beverages, caffeine, and spicy foods. Patient also recommended to remain
upright following PO intake to allow time for food to digest. Patient
verbalized understanding.
 
Patient demonstrating tolerance of recommended diet and use of safe swallowing
strategies. Patient to be discharged from Speech Therapy services at this
time. Speech Therapy available for consult if change in oropharyngeal swallow
mechanism occurs.
 
Isamar Tejada MA CF-SLP

## 2019-02-22 NOTE — NUR
PHYSICAL THERAPY
PAtient continues to refuse PT. Will d/c PT orders. PAtient continues to
refuse. Thank you for this referral. Maisha Hung,PT

## 2019-02-22 NOTE — NUR
SPEECH PATHOLOGY
SLP attempted therap this am, patient was sleeping and did not rouse. Will
attempt at later time.
 
Chandrakant Tyler MA CF-SLP

## 2019-02-22 NOTE — NUR
Patient continues to refuse Occupational Therapy evaluation. Discharge OT
referral.  Thank you.
Amina Weiner OTR/cheko

## 2019-02-23 VITALS — DIASTOLIC BLOOD PRESSURE: 60 MMHG

## 2019-02-23 VITALS — DIASTOLIC BLOOD PRESSURE: 82 MMHG | SYSTOLIC BLOOD PRESSURE: 128 MMHG

## 2019-02-23 VITALS — SYSTOLIC BLOOD PRESSURE: 128 MMHG | DIASTOLIC BLOOD PRESSURE: 84 MMHG

## 2019-02-23 VITALS — SYSTOLIC BLOOD PRESSURE: 100 MMHG | DIASTOLIC BLOOD PRESSURE: 58 MMHG

## 2019-02-23 VITALS — SYSTOLIC BLOOD PRESSURE: 105 MMHG | DIASTOLIC BLOOD PRESSURE: 62 MMHG

## 2019-02-23 LAB
ALBUMIN SERPL-MCNC: 2.2 GM/DL (ref 3.1–4.5)
ALP SERPL-CCNC: 66 U/L (ref 45–117)
ALT SERPL W P-5'-P-CCNC: 29 U/L (ref 12–78)
AST SERPL-CCNC: 22 IU/L (ref 3–35)
BASOPHILS # BLD AUTO: 0 10*3/UL (ref 0–0.1)
BASOPHILS NFR BLD AUTO: 0.1 % (ref 0–1)
BUN SERPL-MCNC: 9 MG/DL (ref 7–24)
CHLORIDE SERPL-SCNC: 97 MMOL/L (ref 98–107)
CREAT SERPL-MCNC: 0.52 MG/DL (ref 0.7–1.3)
EOSINOPHIL # BLD AUTO: 0 % (ref 1–4)
EOSINOPHIL # BLD AUTO: 0 10*3/UL (ref 0–0.4)
ERYTHROCYTE [DISTWIDTH] IN BLOOD BY AUTOMATED COUNT: 11 % (ref 0–14.5)
HCT VFR BLD AUTO: 36.9 % (ref 42–52)
HGB BLD-MCNC: 12.6 G/DL (ref 14–18)
LYMPHOCYTES # BLD AUTO: 1.5 10*3/UL (ref 1.3–4.4)
LYMPHOCYTES NFR BLD AUTO: 16.1 % (ref 27–41)
MCH RBC QN AUTO: 33 PG (ref 27–31)
MCHC RBC AUTO-ENTMCNC: 34.1 G/DL (ref 33–37)
MCV RBC AUTO: 96.6 FL (ref 80–94)
MONOCYTES # BLD AUTO: 1.1 10*3/UL (ref 0.1–1)
MONOCYTES NFR BLD MANUAL: 11.8 % (ref 3–9)
NEUT #: 6.8 10*3/UL (ref 2.3–7.9)
NEUT %: 71.4 % (ref 47–73)
NRBC BLD QL AUTO: 0 % (ref 0–0)
PHOSPHATE SERPL-MCNC: 3 MG/DL (ref 2.5–4.9)
PLATELET # BLD AUTO: 358 10*3/UL (ref 130–400)
PMV BLD AUTO: 8.9 FL (ref 9.6–12.3)
POTASSIUM SERPL-SCNC: 3.7 MMOL/L (ref 3.5–5.1)
PROT SERPL-MCNC: 6.5 GM/DL (ref 6.4–8.2)
RBC # BLD AUTO: 3.82 10*6/UL (ref 4.5–5.9)
SODIUM SERPL-SCNC: 131 MMOL/L (ref 136–145)
WBC NRBC COR # BLD AUTO: 9.5 10*3/UL (ref 4.8–10.8)

## 2019-02-23 NOTE — NUR
EARLIER ATIVAN APPEARS EFFECTIVE AT THIS TIME. PT SLEEPING QUIETLY IN BED.
RESPIRATIONS EASY, REGULAR ON RA. WILL CONTINUE TO MONITOR.

## 2019-02-23 NOTE — NUR
PT GIVEN PO VISTARIL PER PRN ORDER FOR C/O ANXIETY. WILL MONITOR
EFFECTIVENESS. CALL LIGHT WITHIN REACH. VSS

## 2019-02-23 NOTE — NUR
RESTING IN BED WATCHING TV AT THIS TIME. NO NEEDS OR CONCERNS. BED IS IN
LOWEST POSTIION WITH WHEELS LOCKED. CALL LIGHT IS WITHIN REACH. ENCORUAGED
TO USE CALL LIGHT FOR NEEDS.

## 2019-02-24 VITALS — SYSTOLIC BLOOD PRESSURE: 126 MMHG | DIASTOLIC BLOOD PRESSURE: 92 MMHG

## 2019-02-24 VITALS — DIASTOLIC BLOOD PRESSURE: 92 MMHG | SYSTOLIC BLOOD PRESSURE: 142 MMHG

## 2019-02-24 VITALS — DIASTOLIC BLOOD PRESSURE: 54 MMHG | SYSTOLIC BLOOD PRESSURE: 101 MMHG

## 2019-02-24 VITALS — DIASTOLIC BLOOD PRESSURE: 59 MMHG

## 2019-02-24 VITALS — DIASTOLIC BLOOD PRESSURE: 88 MMHG | SYSTOLIC BLOOD PRESSURE: 134 MMHG

## 2019-02-24 LAB
ALBUMIN SERPL-MCNC: 2.1 GM/DL (ref 3.1–4.5)
ALP SERPL-CCNC: 61 U/L (ref 45–117)
ALT SERPL W P-5'-P-CCNC: 37 U/L (ref 12–78)
AST SERPL-CCNC: 28 IU/L (ref 3–35)
BUN SERPL-MCNC: 10 MG/DL (ref 7–24)
CHLORIDE SERPL-SCNC: 100 MMOL/L (ref 98–107)
CREAT SERPL-MCNC: 0.65 MG/DL (ref 0.7–1.3)
POTASSIUM SERPL-SCNC: 3.2 MMOL/L (ref 3.5–5.1)
PROT SERPL-MCNC: 6.3 GM/DL (ref 6.4–8.2)
SODIUM SERPL-SCNC: 134 MMOL/L (ref 136–145)

## 2019-02-24 NOTE — NUR
Hep Lock discontinued.
Site asymptomatic. Pressure applied. Sterile dressing applied.
IV started left forearm with #22 angiocath after 1 sucessful attempt.
The IV site was prepped with Chloraprep.
Heparin lock attached.
Sterile dressing applied. Patient tolerated precedure well.
Procedure performed according to [facility] policy & procedure.
 
KYLEE CARRIZALES

## 2019-02-24 NOTE — NUR
RESTING IN BED ON RIGHT SIDE WITH LIGHTS OUT. CURRENTLY DENIES ANY NEEDS OR
CONCERNS. BED IS IN LOWEST POSITION WITH WHEELS LOCKED. CALL LIGHT IS WITHIN
REACH. ENCOURAGED TO USE CALL LIGHT FOR NEEDS. WILL CONTINUE TO MONITOR.

## 2019-02-24 NOTE — NUR
RESTING IN BED ON RIGHT SIDE WITH EYES CLOSED. RESPIRATIONS ARE EASY AND
REGULAR. NO DISTRESS IS NOTED. CALL LIGHT IS WITHIN REACH.

## 2019-02-24 NOTE — NUR
PATIENT RESTING QUIETLY IN BED. NO DISTRESS NOTED. DENIES ANY PAIN/DISCOMFORT
AT THIS TIME. BETTER APPETITE PER PT. WILL CONTINUE TO MONITOR. VSS. CALL
LIGHT WITHIN REACH.

## 2019-02-25 VITALS — DIASTOLIC BLOOD PRESSURE: 66 MMHG | SYSTOLIC BLOOD PRESSURE: 122 MMHG

## 2019-02-25 VITALS — DIASTOLIC BLOOD PRESSURE: 72 MMHG | SYSTOLIC BLOOD PRESSURE: 116 MMHG

## 2019-02-25 VITALS — DIASTOLIC BLOOD PRESSURE: 60 MMHG | SYSTOLIC BLOOD PRESSURE: 110 MMHG

## 2019-02-25 VITALS — DIASTOLIC BLOOD PRESSURE: 83 MMHG | SYSTOLIC BLOOD PRESSURE: 137 MMHG

## 2019-02-25 VITALS — DIASTOLIC BLOOD PRESSURE: 70 MMHG

## 2019-02-25 NOTE — NUR
PER PATIENT PRN VISTARIL IS EFFECTIVE. NO FURTHER NEEDS AT THIS TIME.
ENCOURAGED TO USE CALL LIGHT FOR ANY NEEDS.

## 2019-02-25 NOTE — NUR
PRN VISTARIL GIVEN FOR COMPLAINTS OF RESTLESSNESS AND ANXIETY. WILL EVALUATE
EFFECTIVENESS. CALL LIGHT IS WITHIN REACH.

## 2019-02-25 NOTE — NUR
Occupational Therapy evaluation completed on 4 with full eval to follow.
Precautions include IV UE, history of falls, impaired memory, patient is low
complexity levle 35098 via chart review, testing and evaluation. Recommend no
further OT at this time and return home alone with home health SN,PT,OT for
home safety assessment. Thank you for this referral.
Amina Weiner OTR/l

## 2019-02-25 NOTE — NUR
DONTRELL EISENBERG, PATIENTS WIFE CALLED IN VERY UNHAPPY ABOUT PATIENTS CARE RECEIVED
HERE. STATED HIS SPINE AND HIP WERE TO BE EXAMINED AND HAVE NOT. ALSO NO ONE
NOTIFIED HIS FAMILY THAT HE WAS ADMITTED FOR TWO DAYS. FAMILY UNHAPPY THAT
PATIENT MAY BE DISCHARGED TOMORROW. THIS NURSE STATED SHE WOULD NOTIFY THE
DOCTOR. NOTIFIED LORRAINE MORALES PATIENT FAMILY IS UNHAPPY AND DOES NOT WANT
PATIENT DISCHARGED WITHOUT SPINE AND HIM EXAMINED.

## 2019-02-25 NOTE — NUR
PHYSICAL THERAPY
Patient evaluated on 4, full evaluation to follow. Continue with PT as per
plan  of care with fall, alarms and alcohol withdraw precautions.
Home for d/c planning. PAtient is moderate complexity via chart review, tests
and evaluation: [id number].  Thank you for this referral. Maisha Hung,PT

## 2019-02-26 VITALS — DIASTOLIC BLOOD PRESSURE: 66 MMHG | SYSTOLIC BLOOD PRESSURE: 120 MMHG

## 2019-02-26 VITALS — DIASTOLIC BLOOD PRESSURE: 68 MMHG

## 2019-02-26 VITALS — DIASTOLIC BLOOD PRESSURE: 56 MMHG

## 2019-02-26 LAB
ALBUMIN SERPL-MCNC: 2.2 GM/DL (ref 3.1–4.5)
ALP SERPL-CCNC: 57 U/L (ref 45–117)
ALT SERPL W P-5'-P-CCNC: 60 U/L (ref 12–78)
AST SERPL-CCNC: 35 IU/L (ref 3–35)
BASOPHILS # BLD AUTO: 0 10*3/UL (ref 0–0.1)
BASOPHILS NFR BLD AUTO: 0.3 % (ref 0–1)
BUN SERPL-MCNC: 6 MG/DL (ref 7–24)
CHLORIDE SERPL-SCNC: 100 MMOL/L (ref 98–107)
CREAT SERPL-MCNC: 0.52 MG/DL (ref 0.7–1.3)
EOSINOPHIL # BLD AUTO: 0 10*3/UL (ref 0–0.4)
EOSINOPHIL # BLD AUTO: 0.1 % (ref 1–4)
ERYTHROCYTE [DISTWIDTH] IN BLOOD BY AUTOMATED COUNT: 11.4 % (ref 0–14.5)
HCT VFR BLD AUTO: 35.8 % (ref 42–52)
HGB BLD-MCNC: 12.1 G/DL (ref 14–18)
LYMPHOCYTES # BLD AUTO: 0.6 10*3/UL (ref 1.3–4.4)
LYMPHOCYTES NFR BLD AUTO: 6.7 % (ref 27–41)
MCH RBC QN AUTO: 33.2 PG (ref 27–31)
MCHC RBC AUTO-ENTMCNC: 33.8 G/DL (ref 33–37)
MCV RBC AUTO: 98.1 FL (ref 80–94)
MONOCYTES # BLD AUTO: 1 10*3/UL (ref 0.1–1)
MONOCYTES NFR BLD MANUAL: 12.1 % (ref 3–9)
NEUT #: 6.8 10*3/UL (ref 2.3–7.9)
NEUT %: 79.4 % (ref 47–73)
NRBC BLD QL AUTO: 0 10*3/UL (ref 0–0)
PHOSPHATE SERPL-MCNC: 2.9 MG/DL (ref 2.5–4.9)
PLATELET # BLD AUTO: 434 10*3/UL (ref 130–400)
PMV BLD AUTO: 8.8 FL (ref 9.6–12.3)
POTASSIUM SERPL-SCNC: 3.4 MMOL/L (ref 3.5–5.1)
PROT SERPL-MCNC: 6.3 GM/DL (ref 6.4–8.2)
RBC # BLD AUTO: 3.65 10*6/UL (ref 4.5–5.9)
SODIUM SERPL-SCNC: 134 MMOL/L (ref 136–145)
WBC NRBC COR # BLD AUTO: 8.6 10*3/UL (ref 4.8–10.8)

## 2019-02-26 NOTE — NUR
IV SITE LEAKING, IV SITE D/C'D, PT REFUSES TO HAVE IV RESTARTED DUE TO
DISCHARGE TODAY         CHAS PIMENTEL SPTOMMIECC

## 2019-02-26 NOTE — NUR
IN BED AWAKE ALERT AND ORIENTED. FORGETFUL TO TIME. ORIENTED TO
SEASON, PRESIDENT, PLACE AND SELF. STATES HE DOES NOT WANT HIS EX-WIFE TO HAVE
ANY INFORMATION BUT HIS DAUGHTER IS ALLOWED. DENIES SOB ON RA, C/O DRY COUGH,
RHONCHI T/O, DENIES PAIN, N/V/D/C, LAST BM YEST, NO EDEMA NOTED, DENIES
DIFFICULTY URINATING. WILL CONT TO MONITOR. CALL LIGHT IN REACH. NO C/O.
ORDERS REVIEWED. WILL CONT TO MONITOR. CALL LIGHT IN REACH.

## 2019-02-26 NOTE — NUR
PHYSICAL THERAPY CO-SIGN
 
 
I approve of the Phyical Therapy notes written above.
 
                                VENESSA PATTERSON PT
\

## 2019-02-26 NOTE — NUR
PT STATES RELIEF OF NERVOUSNESS AFTER TAKING PRN MEDICATION. NO OTHER
COMPLAINTS AT THIS TIME.
ANTONIO.KATHY PIMENTEL

## 2019-02-26 NOTE — NUR
PHYSICAL THERAPY
Patient presented to therapy in supine with head of bed flat and no concerns
or complaints. Patient does say that he wants discharged from the hospital
because he is tired of it. Patient does complain of some L HIP PAIN.  Patient
agrees to therapy session. Patient was identified by name and . Patient
performed all transfers Independently.  Patient ambulated to and from restroom
throughout the day. Patient performed ambulation 220' x 1 with no assistive
device and CLose Supervision. Patient wore mask and gloves while he was in
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
Patient was left sitting on EOB wit hcall light within reach. Patient is
INDEPENDENT IN HIS ROOM. Patient was 1:1 with this PTA for 12 minutes total.
                                    CHANELL JOHNSON PTA

## 2019-02-26 NOTE — NUR
PT RESTING IN BED, ASSESSMENT COMPLETED. PT HAS NO COMPLAINTS AT THIS TIME
WILL CONTINUE TO MONITOR.
ANTONIO.AKTHY PIMENTEL

## 2019-08-03 ENCOUNTER — HOSPITAL ENCOUNTER (EMERGENCY)
Dept: HOSPITAL 83 - ED | Age: 59
LOS: 1 days | Discharge: OTHER INSTITUTION | End: 2019-08-04
Payer: COMMERCIAL

## 2019-08-03 VITALS — BODY MASS INDEX: 26.66 KG/M2 | HEIGHT: 68.98 IN | WEIGHT: 180 LBS

## 2019-08-03 DIAGNOSIS — Z79.82: ICD-10-CM

## 2019-08-03 DIAGNOSIS — Z79.899: ICD-10-CM
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
## 2019-08-03 DIAGNOSIS — F17.200: ICD-10-CM
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
## 2019-08-03 DIAGNOSIS — M54.9: ICD-10-CM
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
## 2019-08-03 DIAGNOSIS — M25.562: Primary | ICD-10-CM

## 2019-09-06 ENCOUNTER — HOSPITAL ENCOUNTER (OUTPATIENT)
Dept: HOSPITAL 83 - RESCLI | Age: 59
Discharge: HOME | End: 2019-09-06
Attending: INTERNAL MEDICINE
Payer: COMMERCIAL

## 2019-09-06 ENCOUNTER — HOSPITAL ENCOUNTER (EMERGENCY)
Dept: HOSPITAL 83 - ED | Age: 59
Discharge: STILL ADMITTED OR ADMINISTRATIVE | End: 2019-09-06
Payer: COMMERCIAL

## 2019-09-06 VITALS — DIASTOLIC BLOOD PRESSURE: 80 MMHG | SYSTOLIC BLOOD PRESSURE: 112 MMHG

## 2019-09-06 VITALS — SYSTOLIC BLOOD PRESSURE: 149 MMHG | DIASTOLIC BLOOD PRESSURE: 102 MMHG

## 2019-09-06 VITALS — HEIGHT: 65.98 IN | WEIGHT: 186 LBS | BODY MASS INDEX: 29.89 KG/M2

## 2019-09-06 DIAGNOSIS — F17.200: ICD-10-CM

## 2019-09-06 DIAGNOSIS — G47.00: ICD-10-CM

## 2019-09-06 DIAGNOSIS — E55.9: Primary | ICD-10-CM

## 2019-09-06 DIAGNOSIS — F10.20: ICD-10-CM

## 2019-09-06 DIAGNOSIS — K92.1: ICD-10-CM

## 2019-09-06 DIAGNOSIS — K21.9: ICD-10-CM

## 2019-09-06 DIAGNOSIS — E78.5: ICD-10-CM

## 2019-09-06 DIAGNOSIS — G62.9: ICD-10-CM

## 2019-09-06 DIAGNOSIS — Z79.899: ICD-10-CM

## 2019-09-06 DIAGNOSIS — Z79.82: ICD-10-CM

## 2019-09-06 DIAGNOSIS — N40.1: ICD-10-CM

## 2019-09-06 DIAGNOSIS — F32.9: ICD-10-CM

## 2019-09-06 DIAGNOSIS — E78.2: ICD-10-CM

## 2019-09-06 DIAGNOSIS — G89.29: ICD-10-CM

## 2019-09-06 DIAGNOSIS — E03.9: ICD-10-CM

## 2019-09-06 DIAGNOSIS — F41.1: ICD-10-CM

## 2019-09-06 DIAGNOSIS — M54.5: ICD-10-CM

## 2019-09-06 DIAGNOSIS — Z88.8: ICD-10-CM

## 2019-09-06 DIAGNOSIS — G40.909: ICD-10-CM

## 2019-09-06 DIAGNOSIS — I10: ICD-10-CM

## 2019-09-06 DIAGNOSIS — I50.32: ICD-10-CM

## 2019-09-06 DIAGNOSIS — I11.0: ICD-10-CM

## 2019-09-06 DIAGNOSIS — R10.13: Primary | ICD-10-CM

## 2019-09-06 DIAGNOSIS — R35.0: ICD-10-CM

## 2019-09-06 LAB
ALBUMIN SERPL-MCNC: 3.4 GM/DL (ref 3.1–4.5)
ALP SERPL-CCNC: 142 U/L (ref 45–117)
ALT SERPL W P-5'-P-CCNC: 25 U/L (ref 12–78)
APPEARANCE UR: CLEAR
APTT PPP: 25.5 SECONDS (ref 20–32.1)
AST SERPL-CCNC: 20 IU/L (ref 3–35)
BASOPHILS # BLD AUTO: 0.1 10*3/UL (ref 0–0.1)
BASOPHILS NFR BLD AUTO: 1 % (ref 0–1)
BILIRUB UR QL STRIP: NEGATIVE
BUN SERPL-MCNC: 4 MG/DL (ref 7–24)
CHLORIDE SERPL-SCNC: 100 MMOL/L (ref 98–107)
COLOR UR: YELLOW
CREAT SERPL-MCNC: 0.52 MG/DL (ref 0.7–1.3)
EOSINOPHIL # BLD AUTO: 0.2 10*3/UL (ref 0–0.4)
EOSINOPHIL # BLD AUTO: 1.9 % (ref 1–4)
ERYTHROCYTE [DISTWIDTH] IN BLOOD BY AUTOMATED COUNT: 13 % (ref 0–14.5)
FERRITIN SERPL-MCNC: 111.9 NG/ML (ref 22–322)
GLUCOSE UR QL: NEGATIVE
HCT VFR BLD AUTO: 37.6 % (ref 42–52)
HGB BLD-MCNC: 13 G/DL (ref 14–18)
HGB UR QL STRIP: NEGATIVE
INR BLD: 1 (ref 2–3.5)
IRON SERPL-MCNC: 53 UG/DL (ref 65–175)
KETONES UR QL STRIP: NEGATIVE
LEUKOCYTE ESTERASE UR QL STRIP: NEGATIVE
LIPASE SERPL-CCNC: 191 U/L (ref 73–393)
LYMPHOCYTES # BLD AUTO: 2.5 10*3/UL (ref 1.3–4.4)
LYMPHOCYTES NFR BLD AUTO: 30.4 % (ref 27–41)
MCH RBC QN AUTO: 32.4 PG (ref 27–31)
MCHC RBC AUTO-ENTMCNC: 34.6 G/DL (ref 33–37)
MCV RBC AUTO: 93.8 FL (ref 80–94)
MONOCYTES # BLD AUTO: 1 10*3/UL (ref 0.1–1)
MONOCYTES NFR BLD MANUAL: 12.3 % (ref 3–9)
NEUT #: 4.5 10*3/UL (ref 2.3–7.9)
NEUT %: 53.9 % (ref 47–73)
NITRITE UR QL STRIP: NEGATIVE
NRBC BLD QL AUTO: 0 % (ref 0–0)
PH UR STRIP: 5.5 [PH] (ref 5–9)
PLATELET # BLD AUTO: 248 10*3/UL (ref 130–400)
PMV BLD AUTO: 9.6 FL (ref 9.6–12.3)
POTASSIUM SERPL-SCNC: 3.5 MMOL/L (ref 3.5–5.1)
PROT SERPL-MCNC: 7.5 GM/DL (ref 6.4–8.2)
RBC # BLD AUTO: 4.01 10*6/UL (ref 4.5–5.9)
SODIUM SERPL-SCNC: 130 MMOL/L (ref 136–145)
SP GR UR: <= 1.005 (ref 1–1.03)
TIBC SERPL-MCNC: 339 UG/DL (ref 250–450)
TROPONIN I SERPL-MCNC: < 0.015 NG/ML (ref ?–0.04)
UROBILINOGEN UR STRIP-MCNC: 0.2 E.U./DL (ref 0.2–1)
VITAMIN B12: 341 PG/ML (ref 247–911)
WBC #/AREA URNS HPF: (no result) WBC/HPF (ref 0–5)
WBC NRBC COR # BLD AUTO: 8.3 10*3/UL (ref 4.8–10.8)

## 2019-09-06 NOTE — NUR
PT W/O DISTRESS NOTED POSITIONED FOR COMFORT W/SAFETY PRECAUTIONS INTACT AND
CALL LIGHT WITHIN REACH,NO ADDITIONAL COMPLAINTS VOICED.

## 2019-09-06 NOTE — EKG
[place], [place]
 
                               ELECTROCARDIOGRAM REPORT
 
        NAME: SHARYN EISENBERG                   ACCT #: [id number]  
        UNIT #: [id number]                        ROOM:           
        DOCTOR: EPIPHCOURTNEY DRAFT REPORT          BIRTHDATE: [birth year]
 
 
 

 
 
                           [facility]
                                       
Test Date:    2019               Test Time:    16:32:18
Pat Name:     SHARYN EISENBERG             Department:   
Patient ID:   [id number]             Room:         [facility]
Gender:       M                        Technician:   Kim Harrison
:          1960               Requested By: DARRYL JOSE
Order Number: [id number]      Reading MD:   Emmy Sifuentes MD
                                 Measurements
Intervals                              Axis          
Rate:         85                       P:            54
NM:           166                      QRS:          -15
QRSD:         97                       T:            39
QT:           371                                    
QTc:          442                                    
                           Interpretive Statements
Sinus rhythm
Borderline left axis deviation
Compared to ECG 2019 18:35:16
Sinus tachycardia no longer present
 
Electronically Signed On 2019 7:45:26 PDT by Emmy Sifuentes MD
 
CM:EKGRPT:ELECTROCARDIOGRAM REPORT
 
D: 19 1632
T: 19 0745
    
DARRYL PETERSEN DRAFT REPORT         
DARRYL JOSE DO

## 2019-09-11 ENCOUNTER — HOSPITAL ENCOUNTER (OUTPATIENT)
Dept: HOSPITAL 83 - ORTHO | Age: 59
Discharge: HOME | End: 2019-09-11
Attending: ORTHOPAEDIC SURGERY
Payer: COMMERCIAL
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
## 2019-09-11 DIAGNOSIS — S62.647D: Primary | ICD-10-CM

## 2019-09-24 ENCOUNTER — HOSPITAL ENCOUNTER (INPATIENT)
Dept: HOSPITAL 83 - ED | Age: 59
Discharge: HOME | DRG: 392 | End: 2019-09-24
Attending: INTERNAL MEDICINE | Admitting: INTERNAL MEDICINE
Payer: COMMERCIAL

## 2019-09-24 VITALS — WEIGHT: 175 LBS | BODY MASS INDEX: 28.12 KG/M2 | HEIGHT: 66.14 IN

## 2019-09-24 VITALS — DIASTOLIC BLOOD PRESSURE: 85 MMHG

## 2019-09-24 VITALS — SYSTOLIC BLOOD PRESSURE: 132 MMHG | DIASTOLIC BLOOD PRESSURE: 76 MMHG

## 2019-09-24 VITALS — DIASTOLIC BLOOD PRESSURE: 81 MMHG

## 2019-09-24 VITALS — DIASTOLIC BLOOD PRESSURE: 95 MMHG

## 2019-09-24 VITALS — DIASTOLIC BLOOD PRESSURE: 91 MMHG

## 2019-09-24 DIAGNOSIS — F17.210: ICD-10-CM

## 2019-09-24 DIAGNOSIS — E55.9: ICD-10-CM

## 2019-09-24 DIAGNOSIS — G62.9: ICD-10-CM

## 2019-09-24 DIAGNOSIS — I11.0: ICD-10-CM

## 2019-09-24 DIAGNOSIS — G40.909: ICD-10-CM

## 2019-09-24 DIAGNOSIS — I50.32: ICD-10-CM

## 2019-09-24 DIAGNOSIS — Z79.899: ICD-10-CM

## 2019-09-24 DIAGNOSIS — E87.1: ICD-10-CM

## 2019-09-24 DIAGNOSIS — F41.9: ICD-10-CM

## 2019-09-24 DIAGNOSIS — Z71.6: ICD-10-CM

## 2019-09-24 DIAGNOSIS — E03.9: ICD-10-CM

## 2019-09-24 DIAGNOSIS — F32.9: ICD-10-CM

## 2019-09-24 DIAGNOSIS — E66.3: ICD-10-CM

## 2019-09-24 DIAGNOSIS — F10.120: ICD-10-CM

## 2019-09-24 DIAGNOSIS — K21.9: Primary | ICD-10-CM

## 2019-09-24 DIAGNOSIS — Z82.49: ICD-10-CM

## 2019-09-24 DIAGNOSIS — Z91.5: ICD-10-CM

## 2019-09-24 DIAGNOSIS — Z87.81: ICD-10-CM

## 2019-09-24 DIAGNOSIS — E78.2: ICD-10-CM

## 2019-09-24 DIAGNOSIS — Z22.322: ICD-10-CM

## 2019-09-24 LAB
ALBUMIN SERPL-MCNC: 3.6 GM/DL (ref 3.1–4.5)
ALP SERPL-CCNC: 140 U/L (ref 45–117)
ALT SERPL W P-5'-P-CCNC: 25 U/L (ref 12–78)
APTT PPP: 26.6 SECONDS (ref 20–32.1)
AST SERPL-CCNC: 28 IU/L (ref 3–35)
BASOPHILS # BLD AUTO: 0.1 10*3/UL (ref 0–0.1)
BASOPHILS NFR BLD AUTO: 1 % (ref 0–1)
BUN SERPL-MCNC: 9 MG/DL (ref 7–24)
BUN SERPL-MCNC: 9 MG/DL (ref 7–24)
CHLORIDE SERPL-SCNC: 102 MMOL/L (ref 98–107)
CHLORIDE SERPL-SCNC: 93 MMOL/L (ref 98–107)
CREAT SERPL-MCNC: 0.62 MG/DL (ref 0.7–1.3)
CREAT SERPL-MCNC: 0.66 MG/DL (ref 0.7–1.3)
EOSINOPHIL # BLD AUTO: 0 10*3/UL (ref 0–0.4)
EOSINOPHIL # BLD AUTO: 0.2 % (ref 1–4)
ERYTHROCYTE [DISTWIDTH] IN BLOOD BY AUTOMATED COUNT: 13.8 % (ref 0–14.5)
ETHANOL SERPL-MCNC: 227 MG/DL (ref ?–3)
HCT VFR BLD AUTO: 41.3 % (ref 42–52)
HGB BLD-MCNC: 14.6 G/DL (ref 14–18)
INR BLD: 1 (ref 2–3.5)
LIPASE SERPL-CCNC: 145 U/L (ref 73–393)
LYMPHOCYTES # BLD AUTO: 2 10*3/UL (ref 1.3–4.4)
LYMPHOCYTES NFR BLD AUTO: 32.8 % (ref 27–41)
MCH RBC QN AUTO: 32.7 PG (ref 27–31)
MCHC RBC AUTO-ENTMCNC: 35.4 G/DL (ref 33–37)
MCV RBC AUTO: 92.4 FL (ref 80–94)
MONOCYTES # BLD AUTO: 1 10*3/UL (ref 0.1–1)
MONOCYTES NFR BLD MANUAL: 16.3 % (ref 3–9)
NEUT #: 3 10*3/UL (ref 2.3–7.9)
NEUT %: 49.5 % (ref 47–73)
NRBC BLD QL AUTO: 0 10*3/UL (ref 0–0)
PLATELET # BLD AUTO: 309 10*3/UL (ref 130–400)
PMV BLD AUTO: 8.9 FL (ref 9.6–12.3)
POTASSIUM SERPL-SCNC: 3.5 MMOL/L (ref 3.5–5.1)
POTASSIUM SERPL-SCNC: 3.7 MMOL/L (ref 3.5–5.1)
PROT SERPL-MCNC: 7.9 GM/DL (ref 6.4–8.2)
RBC # BLD AUTO: 4.47 10*6/UL (ref 4.5–5.9)
SODIUM SERPL-SCNC: 127 MMOL/L (ref 136–145)
SODIUM SERPL-SCNC: 133 MMOL/L (ref 136–145)
TROPONIN I SERPL-MCNC: < 0.015 NG/ML (ref ?–0.04)
WBC NRBC COR # BLD AUTO: 6 10*3/UL (ref 4.8–10.8)

## 2019-09-24 NOTE — NUR
Time: 0907
A 59  year old  admitted to 
under services of RACHEL BLAND DO.
Pt. arrived via stretcher from
ER.  Chief complaint: HAD CHEST PAIN, RESLOVED, STATES MAY HAVE BEEN ANXIETY ,
IT WAS SHARP. .
 
AMARI URIAS

## 2019-09-24 NOTE — NUR
DAUGHTER CALLED AT PATIENT'S REQUEST TO INFORM HER OF HIS ADMISSION. PHONE
NUMBER [phone]. HER NAME IS ARIE.

## 2019-09-24 NOTE — NUR
LEONIEW received notice that the patient was requesting DPOA-HC papers. LEONIEW
provided the patient with the documents and could have them filled out at this
facility. Patient stated that he may be getting discharged today. LEONIEW
explained he could take the documents home and have a notary complete them.
Patient understood. MICHELLE Angelse

## 2020-01-14 ENCOUNTER — HOSPITAL ENCOUNTER (EMERGENCY)
Dept: HOSPITAL 83 - ED | Age: 60
Discharge: HOME | End: 2020-01-14
Payer: SELF-PAY

## 2020-01-14 VITALS — WEIGHT: 185 LBS | HEIGHT: 65.98 IN | BODY MASS INDEX: 29.73 KG/M2

## 2020-01-14 DIAGNOSIS — F41.9: ICD-10-CM

## 2020-01-14 DIAGNOSIS — G40.909: ICD-10-CM

## 2020-01-14 DIAGNOSIS — G62.9: ICD-10-CM

## 2020-01-14 DIAGNOSIS — J45.909: Primary | ICD-10-CM

## 2020-01-14 DIAGNOSIS — F32.9: ICD-10-CM

## 2020-01-14 DIAGNOSIS — E03.9: ICD-10-CM

## 2020-01-14 DIAGNOSIS — J06.9: ICD-10-CM

## 2020-01-14 DIAGNOSIS — K21.9: ICD-10-CM

## 2020-01-14 DIAGNOSIS — I11.0: ICD-10-CM

## 2020-01-14 DIAGNOSIS — Z79.82: ICD-10-CM

## 2020-01-14 DIAGNOSIS — E78.2: ICD-10-CM

## 2020-01-14 DIAGNOSIS — Z79.899: ICD-10-CM

## 2020-01-14 DIAGNOSIS — I50.32: ICD-10-CM

## 2020-01-14 DIAGNOSIS — F17.210: ICD-10-CM

## 2020-01-14 LAB
ALBUMIN SERPL-MCNC: 3.5 GM/DL (ref 3.1–4.5)
ALP SERPL-CCNC: 87 U/L (ref 45–117)
ALT SERPL W P-5'-P-CCNC: 74 U/L (ref 12–78)
AST SERPL-CCNC: 75 IU/L (ref 3–35)
BASOPHILS # BLD AUTO: 0 10*3/UL (ref 0–0.1)
BASOPHILS NFR BLD AUTO: 0.6 % (ref 0–1)
BUN SERPL-MCNC: 3 MG/DL (ref 7–24)
CHLORIDE SERPL-SCNC: 100 MMOL/L (ref 98–107)
CREAT SERPL-MCNC: 0.51 MG/DL (ref 0.7–1.3)
EOSINOPHIL # BLD AUTO: 0.1 10*3/UL (ref 0–0.4)
EOSINOPHIL # BLD AUTO: 1.5 % (ref 1–4)
ERYTHROCYTE [DISTWIDTH] IN BLOOD BY AUTOMATED COUNT: 14.2 % (ref 0–14.5)
HCT VFR BLD AUTO: 41.9 % (ref 42–52)
HGB BLD-MCNC: 14.2 G/DL (ref 14–18)
LYMPHOCYTES # BLD AUTO: 2 10*3/UL (ref 1.3–4.4)
LYMPHOCYTES NFR BLD AUTO: 30.6 % (ref 27–41)
MCH RBC QN AUTO: 32.6 PG (ref 27–31)
MCHC RBC AUTO-ENTMCNC: 33.9 G/DL (ref 33–37)
MCV RBC AUTO: 96.3 FL (ref 80–94)
MONOCYTES # BLD AUTO: 0.9 10*3/UL (ref 0.1–1)
MONOCYTES NFR BLD MANUAL: 13.8 % (ref 3–9)
NEUT #: 3.5 10*3/UL (ref 2.3–7.9)
NEUT %: 53.2 % (ref 47–73)
NRBC BLD QL AUTO: 0 % (ref 0–0)
PLATELET # BLD AUTO: 188 10*3/UL (ref 130–400)
PMV BLD AUTO: 9.8 FL (ref 9.6–12.3)
POTASSIUM SERPL-SCNC: 3.8 MMOL/L (ref 3.5–5.1)
PROT SERPL-MCNC: 7.8 GM/DL (ref 6.4–8.2)
RBC # BLD AUTO: 4.35 10*6/UL (ref 4.5–5.9)
SODIUM SERPL-SCNC: 131 MMOL/L (ref 136–145)
TROPONIN I SERPL-MCNC: < 0.015 NG/ML (ref ?–0.04)
WBC NRBC COR # BLD AUTO: 6.5 10*3/UL (ref 4.8–10.8)

## 2020-10-16 ENCOUNTER — HOSPITAL ENCOUNTER (OUTPATIENT)
Dept: HOSPITAL 83 - US | Age: 60
Discharge: HOME | End: 2020-10-16
Attending: FAMILY MEDICINE
Payer: COMMERCIAL

## 2020-10-16 DIAGNOSIS — R79.82: ICD-10-CM

## 2020-10-16 DIAGNOSIS — R73.03: ICD-10-CM

## 2020-10-16 DIAGNOSIS — F03.90: ICD-10-CM

## 2020-10-16 DIAGNOSIS — Z12.5: Primary | ICD-10-CM

## 2020-10-16 LAB
25(OH)D3 SERPL-MCNC: 54.7 NG/ML (ref 30–100)
ALBUMIN SERPL-MCNC: 3.5 GM/DL (ref 3.1–4.5)
ALP SERPL-CCNC: 75 U/L (ref 45–117)
ALT SERPL W P-5'-P-CCNC: 30 U/L (ref 12–78)
AST SERPL-CCNC: 27 IU/L (ref 3–35)
BUN SERPL-MCNC: 11 MG/DL (ref 7–24)
CHLORIDE SERPL-SCNC: 101 MMOL/L (ref 98–107)
CREAT SERPL-MCNC: 0.57 MG/DL (ref 0.7–1.3)
ERYTHROCYTE [DISTWIDTH] IN BLOOD BY AUTOMATED COUNT: 11.1 % (ref 0–14.5)
HCT VFR BLD AUTO: 43 % (ref 42–52)
MCH RBC QN AUTO: 33.7 PG (ref 27–31)
MCHC RBC AUTO-ENTMCNC: 34 G/DL (ref 33–37)
MCV RBC AUTO: 99.3 FL (ref 80–94)
NRBC BLD QL AUTO: 0 % (ref 0–0)
PLATELET # BLD AUTO: 238 10*3/UL (ref 130–400)
PMV BLD AUTO: 9.2 FL (ref 9.6–12.3)
POTASSIUM SERPL-SCNC: 4.6 MMOL/L (ref 3.5–5.1)
PROT SERPL-MCNC: 8 GM/DL (ref 6.4–8.2)
RBC # BLD AUTO: 4.33 10*6/UL (ref 4.5–5.9)
SODIUM SERPL-SCNC: 132 MMOL/L (ref 136–145)
TSH SERPL DL<=0.005 MIU/L-ACNC: 2.67 UIU/ML (ref 0.36–4.75)
VITAMIN B12: 321 PG/ML (ref 247–911)
WBC NRBC COR # BLD AUTO: 9.7 10*3/UL (ref 4.8–10.8)

## 2020-10-18 LAB — HEPATITIS C VIRUS ANTIBODY: <0.1 (ref 0–0.9)

## 2020-10-19 ENCOUNTER — HOSPITAL ENCOUNTER (EMERGENCY)
Dept: HOSPITAL 83 - ED | Age: 60
Discharge: HOME | End: 2020-10-19
Payer: COMMERCIAL

## 2020-10-19 VITALS — WEIGHT: 200 LBS | HEIGHT: 65.98 IN | BODY MASS INDEX: 32.14 KG/M2

## 2020-10-19 DIAGNOSIS — F17.200: ICD-10-CM

## 2020-10-19 DIAGNOSIS — R51.9: Primary | ICD-10-CM

## 2020-10-22 RX ORDER — LOSARTAN POTASSIUM 25 MG/1
1 TABLET ORAL DAILY
COMMUNITY
Start: 2020-10-21

## 2020-10-22 RX ORDER — APIXABAN 5 MG/1
1 TABLET, FILM COATED ORAL DAILY
COMMUNITY
Start: 2020-10-21

## 2020-10-22 RX ORDER — AMIODARONE HYDROCHLORIDE 200 MG/1
1 TABLET ORAL DAILY
COMMUNITY
Start: 2020-10-05

## 2021-04-16 ENCOUNTER — HOSPITAL ENCOUNTER (OUTPATIENT)
Dept: HOSPITAL 83 - LAB | Age: 61
Discharge: HOME | End: 2021-04-16
Attending: FAMILY MEDICINE
Payer: COMMERCIAL

## 2021-04-16 DIAGNOSIS — F03.90: ICD-10-CM

## 2021-04-16 DIAGNOSIS — E55.9: Primary | ICD-10-CM

## 2021-04-16 DIAGNOSIS — E03.9: ICD-10-CM

## 2021-04-16 DIAGNOSIS — E78.00: ICD-10-CM

## 2021-04-16 LAB
25(OH)D3 SERPL-MCNC: 52.9 NG/ML (ref 30–100)
ALBUMIN SERPL-MCNC: 3.6 GM/DL (ref 3.1–4.5)
ALP SERPL-CCNC: 98 U/L (ref 45–117)
ALT SERPL W P-5'-P-CCNC: 77 U/L (ref 12–78)
AST SERPL-CCNC: 104 IU/L (ref 3–35)
BUN SERPL-MCNC: 4 MG/DL (ref 7–24)
CHLORIDE SERPL-SCNC: 103 MMOL/L (ref 98–107)
CHOLEST SERPL-MCNC: 142 MG/DL (ref ?–200)
CK SERPL-CCNC: 54 U/L (ref 39–308)
CREAT SERPL-MCNC: 0.67 MG/DL (ref 0.7–1.3)
ERYTHROCYTE [DISTWIDTH] IN BLOOD BY AUTOMATED COUNT: 13.1 % (ref 0–14.5)
HCT VFR BLD AUTO: 43.8 % (ref 42–52)
HDLC SERPL-MCNC: 48 MG/DL (ref 40–60)
LDLC SERPL DIRECT ASSAY-MCNC: 68 MG/DL (ref 9–159)
MCH RBC QN AUTO: 35.2 PG (ref 27–31)
MCHC RBC AUTO-ENTMCNC: 33.8 G/DL (ref 33–37)
MCV RBC AUTO: 104 FL (ref 80–94)
NRBC BLD QL AUTO: 0 % (ref 0–0)
PLATELET # BLD AUTO: 249 10*3/UL (ref 130–400)
PMV BLD AUTO: 9.4 FL (ref 9.6–12.3)
POTASSIUM SERPL-SCNC: 4.5 MMOL/L (ref 3.5–5.1)
PROT SERPL-MCNC: 8.2 GM/DL (ref 6.4–8.2)
RBC # BLD AUTO: 4.21 10*6/UL (ref 4.5–5.9)
SODIUM SERPL-SCNC: 134 MMOL/L (ref 136–145)
T4 FREE SERPL-MCNC: 0.92 NG/DL (ref 0.76–1.46)
TRIGL SERPL-MCNC: 130 MG/DL (ref ?–150)
TSH SERPL DL<=0.005 MIU/L-ACNC: 1.37 UIU/ML (ref 0.36–4.75)
VITAMIN B12: 642 PG/ML (ref 247–911)
VLDLC SERPL CALC-MCNC: 26 MG/DL (ref 6–40)
WBC NRBC COR # BLD AUTO: 6 10*3/UL (ref 4.8–10.8)

## 2021-04-18 LAB
PROSTATE SPECIFIC AG, SERUM: 0.3 NG/ML (ref 0–4)
RPR SER QL: 0.07 NG/ML

## 2021-09-01 ENCOUNTER — HOSPITAL ENCOUNTER (OUTPATIENT)
Dept: HOSPITAL 83 - LAB | Age: 61
Discharge: HOME | End: 2021-09-01
Attending: FAMILY MEDICINE
Payer: COMMERCIAL

## 2021-09-01 DIAGNOSIS — R53.83: ICD-10-CM

## 2021-09-01 DIAGNOSIS — M47.816: ICD-10-CM

## 2021-09-01 DIAGNOSIS — M89.38: ICD-10-CM

## 2021-09-01 DIAGNOSIS — Z12.5: ICD-10-CM

## 2021-09-01 DIAGNOSIS — M48.061: ICD-10-CM

## 2021-09-01 DIAGNOSIS — E55.9: ICD-10-CM

## 2021-09-01 DIAGNOSIS — Z72.89: ICD-10-CM

## 2021-09-01 DIAGNOSIS — J44.9: Primary | ICD-10-CM

## 2021-09-01 DIAGNOSIS — F03.90: ICD-10-CM

## 2021-09-01 DIAGNOSIS — I10: ICD-10-CM

## 2021-09-01 DIAGNOSIS — M51.36: ICD-10-CM

## 2021-09-01 LAB
25(OH)D3 SERPL-MCNC: 78.7 NG/ML (ref 30–100)
ALBUMIN SERPL-MCNC: 3.2 GM/DL (ref 3.1–4.5)
ALP SERPL-CCNC: 100 U/L (ref 45–117)
ALT SERPL W P-5'-P-CCNC: 56 U/L (ref 12–78)
AST SERPL-CCNC: 65 IU/L (ref 3–35)
BUN SERPL-MCNC: 4 MG/DL (ref 7–24)
CHLORIDE SERPL-SCNC: 99 MMOL/L (ref 98–107)
CHOLEST SERPL-MCNC: 128 MG/DL (ref ?–200)
CREAT SERPL-MCNC: 0.53 MG/DL (ref 0.7–1.3)
ERYTHROCYTE [DISTWIDTH] IN BLOOD BY AUTOMATED COUNT: 12.2 % (ref 0–14.5)
HCT VFR BLD AUTO: 42.1 % (ref 42–52)
LDLC SERPL DIRECT ASSAY-MCNC: 62 MG/DL (ref 9–159)
MCH RBC QN AUTO: 35.3 PG (ref 27–31)
MCHC RBC AUTO-ENTMCNC: 34.4 G/DL (ref 33–37)
MCV RBC AUTO: 102.4 FL (ref 80–94)
NRBC BLD QL AUTO: 0 % (ref 0–0)
PLATELET # BLD AUTO: 191 10*3/UL (ref 130–400)
PMV BLD AUTO: 10.3 FL (ref 9.6–12.3)
POTASSIUM SERPL-SCNC: 4.1 MMOL/L (ref 3.5–5.1)
PROT SERPL-MCNC: 7.6 GM/DL (ref 6.4–8.2)
RBC # BLD AUTO: 4.11 10*6/UL (ref 4.5–5.9)
SODIUM SERPL-SCNC: 130 MMOL/L (ref 136–145)
T4 FREE SERPL-MCNC: 1 NG/DL (ref 0.76–1.46)
TRIGL SERPL-MCNC: 191 MG/DL (ref ?–150)
TSH SERPL DL<=0.005 MIU/L-ACNC: 0.95 UIU/ML (ref 0.36–4.75)
VITAMIN B12: 494 PG/ML (ref 247–911)
WBC NRBC COR # BLD AUTO: 9 10*3/UL (ref 4.8–10.8)

## 2021-09-02 LAB — HEPATITIS C VIRUS ANTIBODY: 0.1 S/CO (ref 0–0.9)

## 2022-05-05 ENCOUNTER — HOSPITAL ENCOUNTER (INPATIENT)
Dept: HOSPITAL 83 - ED | Age: 62
LOS: 2 days | Discharge: HOME | End: 2022-05-07
Attending: INTERNAL MEDICINE | Admitting: INTERNAL MEDICINE
Payer: COMMERCIAL

## 2022-05-05 VITALS — DIASTOLIC BLOOD PRESSURE: 62 MMHG

## 2022-05-05 VITALS — SYSTOLIC BLOOD PRESSURE: 119 MMHG | DIASTOLIC BLOOD PRESSURE: 65 MMHG

## 2022-05-05 VITALS — SYSTOLIC BLOOD PRESSURE: 110 MMHG | DIASTOLIC BLOOD PRESSURE: 65 MMHG

## 2022-05-05 VITALS — WEIGHT: 201 LBS | HEIGHT: 65.98 IN | BODY MASS INDEX: 32.3 KG/M2

## 2022-05-05 DIAGNOSIS — Z82.49: ICD-10-CM

## 2022-05-05 DIAGNOSIS — M85.80: ICD-10-CM

## 2022-05-05 DIAGNOSIS — E87.1: ICD-10-CM

## 2022-05-05 DIAGNOSIS — D64.9: ICD-10-CM

## 2022-05-05 DIAGNOSIS — Z71.6: ICD-10-CM
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
## 2022-05-05 DIAGNOSIS — K21.00: ICD-10-CM

## 2022-05-05 DIAGNOSIS — S42.222A: ICD-10-CM

## 2022-05-05 DIAGNOSIS — E03.9: ICD-10-CM

## 2022-05-05 DIAGNOSIS — I48.0: ICD-10-CM
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
## 2022-05-05 DIAGNOSIS — M70.21: ICD-10-CM

## 2022-05-05 DIAGNOSIS — I42.9: ICD-10-CM
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
## 2022-05-05 DIAGNOSIS — F10.129: Primary | ICD-10-CM

## 2022-05-05 DIAGNOSIS — F17.210: ICD-10-CM

## 2022-05-05 DIAGNOSIS — I10: ICD-10-CM
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
## 2022-05-05 LAB
ALP SERPL-CCNC: 90 U/L (ref 45–117)
ALT SERPL W P-5'-P-CCNC: 47 U/L (ref 12–78)
AMPHETAMINES UR QL SCN: < 1000
APTT PPP: 29.8 SECONDS (ref 20–32.1)
AST SERPL-CCNC: 44 IU/L (ref 3–35)
BARBITURATES UR QL SCN: < 200
BENZODIAZ UR QL SCN: < 200
BUN SERPL-MCNC: 6 MG/DL (ref 7–24)
BZE UR QL SCN: < 300
CANNABINOIDS UR QL SCN: < 50
CHLORIDE SERPL-SCNC: 93 MMOL/L (ref 98–107)
CREAT SERPL-MCNC: 0.58 MG/DL (ref 0.7–1.3)
ERYTHROCYTE [DISTWIDTH] IN BLOOD BY AUTOMATED COUNT: 13.1 % (ref 0–14.5)
ETHANOL SERPL-MCNC: 423 MG/DL (ref ?–3)
HCT VFR BLD AUTO: 38.4 % (ref 42–52)
INR BLD: 1 (ref 2–3.5)
MANUAL DIFFERENTIAL PERFORMED BLD QL: YES
MCH RBC QN AUTO: 32.8 PG (ref 27–31)
MCHC RBC AUTO-ENTMCNC: 35.2 G/DL (ref 33–37)
MCV RBC AUTO: 93.4 FL (ref 80–94)
METHADONE UR QL SCN: < 300
NRBC BLD QL AUTO: 0 % (ref 0–0)
OPIATES UR QL SCN: < 300
PCP UR QL SCN: <  25
PH UR STRIP: 6.5 [PH] (ref 4.5–8)
PLATELET # BLD AUTO: 287 10*3/UL (ref 130–400)
PLATELET SUFFICIENCY: NORMAL
PMV BLD AUTO: 8.3 FL (ref 9.6–12.3)
POTASSIUM SERPL-SCNC: 4.2 MMOL/L (ref 3.5–5.1)
PROT SERPL-MCNC: 8.2 GM/DL (ref 6.4–8.2)
RBC # BLD AUTO: 4.11 10*6/UL (ref 4.5–5.9)
RBC MORPH BLD: NORMAL
SODIUM SERPL-SCNC: 124 MMOL/L (ref 136–145)
SP GR UR: <= 1.005 (ref 1–1.03)
TOTAL CELLS COUNTED: 100 #CELLS
UROBILINOGEN UR STRIP-MCNC: 0.2 E.U./DL (ref 0–1)
WBC NRBC COR # BLD AUTO: 5.7 10*3/UL (ref 4.8–10.8)

## 2022-05-06 VITALS — DIASTOLIC BLOOD PRESSURE: 72 MMHG | SYSTOLIC BLOOD PRESSURE: 111 MMHG

## 2022-05-06 VITALS — DIASTOLIC BLOOD PRESSURE: 92 MMHG | SYSTOLIC BLOOD PRESSURE: 158 MMHG

## 2022-05-06 VITALS — DIASTOLIC BLOOD PRESSURE: 95 MMHG

## 2022-05-06 VITALS — DIASTOLIC BLOOD PRESSURE: 81 MMHG | SYSTOLIC BLOOD PRESSURE: 140 MMHG

## 2022-05-06 VITALS — DIASTOLIC BLOOD PRESSURE: 94 MMHG

## 2022-05-06 VITALS — DIASTOLIC BLOOD PRESSURE: 92 MMHG

## 2022-05-06 LAB
BASOPHILS # BLD AUTO: 1 % (ref 0–1)
BUN SERPL-MCNC: 7 MG/DL (ref 7–24)
CHLORIDE SERPL-SCNC: 98 MMOL/L (ref 98–107)
CREAT SERPL-MCNC: 0.59 MG/DL (ref 0.7–1.3)
ERYTHROCYTE [DISTWIDTH] IN BLOOD BY AUTOMATED COUNT: 13.1 % (ref 0–14.5)
HCT VFR BLD AUTO: 37.4 % (ref 42–52)
MANUAL DIFFERENTIAL PERFORMED BLD QL: YES
MCH RBC QN AUTO: 32.6 PG (ref 27–31)
MCHC RBC AUTO-ENTMCNC: 35 G/DL (ref 33–37)
MCV RBC AUTO: 93 FL (ref 80–94)
NRBC BLD QL AUTO: 0 % (ref 0–0)
PLATELET # BLD AUTO: 301 10*3/UL (ref 130–400)
PLATELET SUFFICIENCY: NORMAL
PMV BLD AUTO: 9 FL (ref 9.6–12.3)
POLYCHROMASIA BLD QL SMEAR: SLIGHT
POTASSIUM SERPL-SCNC: 4.4 MMOL/L (ref 3.5–5.1)
RBC # BLD AUTO: 4.02 10*6/UL (ref 4.5–5.9)
SODIUM SERPL-SCNC: 127 MMOL/L (ref 136–145)
TOTAL CELLS COUNTED: 100 #CELLS
VACUOLATION OF NEUTROPHILS: SLIGHT
WBC #/AREA URNS HPF: (no result) WBC/HPF (ref 0–5)
WBC NRBC COR # BLD AUTO: 5.2 10*3/UL (ref 4.8–10.8)

## 2022-05-07 VITALS — DIASTOLIC BLOOD PRESSURE: 76 MMHG | SYSTOLIC BLOOD PRESSURE: 119 MMHG

## 2022-05-07 VITALS — DIASTOLIC BLOOD PRESSURE: 80 MMHG | SYSTOLIC BLOOD PRESSURE: 159 MMHG

## 2022-05-07 VITALS — DIASTOLIC BLOOD PRESSURE: 80 MMHG

## 2022-05-07 VITALS — SYSTOLIC BLOOD PRESSURE: 156 MMHG | DIASTOLIC BLOOD PRESSURE: 97 MMHG

## 2025-06-30 NOTE — PR
[place], [place]
 
                                      PROGRESS NOTE
 
        [facility]: SHARYN EISENBERG                  [id number]  
        UNIT #: [id number]                       ROOM: [id number]       
        DOCTOR: BARBY SOTO MD,JANINE           BIRTHDATE: [birth year]
 
 
DOS: 02/23/2019
 
SUBJECTIVE:  The patient has been noted comfortably, resting on the bed. 
Coughing has been noted intermittent sputum expectoration.  There were no
symptoms of chest pain, fever, or chills.  The patient has not been reported any
new acute complaints this morning of assessment.
 
OBJECTIVE:
VITAL SIGNS:  For the patient was reported normal temperature, respiratory rate
of 20, heart rate 78, blood pressure 128/82.  Pulse oxygen saturation recorded
at 95% saturation on room air.
HEENT:  Examination shows head was atraumatic.  Eyes nonicterus.
NECK:  Supple.
CARDIOVASCULAR:  S1, S2 audible.
LUNGS:  Scattered crackles, no wheezing.
ABDOMEN:  Soft and nontender.  Bowel sounds present.
EXTREMITIES:  No edema.
 
LABORATORY DATA:  BMP today, sodium 131.  Potassium was normal.  BUN and
creatinine were normal.  CBC this morning:  WBC count was normal.  The chest
x-ray done this morning resolving interstitial infiltration of the lung, the
resolution is noted incomplete.
 
IMPRESSION:  The patient with recurrent hyponatremia that was noted with
bilateral interstitial pneumonia, which has been gradually improving,
possibility of viral organism consideration was considered.
 
PLAN OF MANAGEMENT:  No changes in the plan at the present time.  Continue
current therapy as previously.  Usual care.  Supportive therapy, plan of
management, care plan and treatments.
 
 
 
_________________________________
JANINE DIOR MD
 
CM:PNTRANS
 
D: 02/23/19 1009                 
T: 02/23/19 2009
             
                                                            
JANINE SOTO MD           
                                                            
02/23/19
2009
                              
interface Never